# Patient Record
Sex: FEMALE | Race: WHITE | NOT HISPANIC OR LATINO | Employment: OTHER | URBAN - METROPOLITAN AREA
[De-identification: names, ages, dates, MRNs, and addresses within clinical notes are randomized per-mention and may not be internally consistent; named-entity substitution may affect disease eponyms.]

---

## 2022-02-06 ENCOUNTER — APPOINTMENT (EMERGENCY)
Dept: RADIOLOGY | Facility: HOSPITAL | Age: 72
End: 2022-02-06
Payer: COMMERCIAL

## 2022-02-06 ENCOUNTER — HOSPITAL ENCOUNTER (EMERGENCY)
Facility: HOSPITAL | Age: 72
Discharge: HOME | End: 2022-02-06
Attending: EMERGENCY MEDICINE | Admitting: EMERGENCY MEDICINE
Payer: COMMERCIAL

## 2022-02-06 VITALS
RESPIRATION RATE: 20 BRPM | WEIGHT: 110 LBS | SYSTOLIC BLOOD PRESSURE: 96 MMHG | OXYGEN SATURATION: 98 % | HEART RATE: 85 BPM | BODY MASS INDEX: 21.6 KG/M2 | DIASTOLIC BLOOD PRESSURE: 60 MMHG | TEMPERATURE: 98.3 F | HEIGHT: 60 IN

## 2022-02-06 DIAGNOSIS — M54.6 THORACIC BACK PAIN: Primary | ICD-10-CM

## 2022-02-06 LAB
ALBUMIN SERPL BCP-MCNC: 3.9 G/DL (ref 3.5–5)
ALP SERPL-CCNC: 59 U/L (ref 46–116)
ALT SERPL W P-5'-P-CCNC: 26 U/L (ref 12–78)
ANION GAP SERPL CALCULATED.3IONS-SCNC: 6 MMOL/L (ref 4–13)
AST SERPL W P-5'-P-CCNC: 49 U/L (ref 5–45)
BASOPHILS # BLD AUTO: 0.05 THOUSANDS/ΜL (ref 0–0.1)
BASOPHILS NFR BLD AUTO: 1 % (ref 0–1)
BILIRUB SERPL-MCNC: 0.13 MG/DL (ref 0.2–1)
BUN SERPL-MCNC: 19 MG/DL (ref 5–25)
CALCIUM SERPL-MCNC: 9.2 MG/DL (ref 8.3–10.1)
CARDIAC TROPONIN I PNL SERPL HS: 4 NG/L
CHLORIDE SERPL-SCNC: 105 MMOL/L (ref 100–108)
CO2 SERPL-SCNC: 26 MMOL/L (ref 21–32)
CREAT SERPL-MCNC: 0.85 MG/DL (ref 0.6–1.3)
EOSINOPHIL # BLD AUTO: 0.12 THOUSAND/ΜL (ref 0–0.61)
EOSINOPHIL NFR BLD AUTO: 1 % (ref 0–6)
ERYTHROCYTE [DISTWIDTH] IN BLOOD BY AUTOMATED COUNT: 13 % (ref 11.6–15.1)
GFR SERPL CREATININE-BSD FRML MDRD: 68 ML/MIN/1.73SQ M
GLUCOSE SERPL-MCNC: 93 MG/DL (ref 65–140)
HCT VFR BLD AUTO: 36.4 % (ref 34.8–46.1)
HGB BLD-MCNC: 12.3 G/DL (ref 11.5–15.4)
IMM GRANULOCYTES # BLD AUTO: 0.03 THOUSAND/UL (ref 0–0.2)
IMM GRANULOCYTES NFR BLD AUTO: 0 % (ref 0–2)
LYMPHOCYTES # BLD AUTO: 2.11 THOUSANDS/ΜL (ref 0.6–4.47)
LYMPHOCYTES NFR BLD AUTO: 25 % (ref 14–44)
MCH RBC QN AUTO: 33.2 PG (ref 26.8–34.3)
MCHC RBC AUTO-ENTMCNC: 33.8 G/DL (ref 31.4–37.4)
MCV RBC AUTO: 98 FL (ref 82–98)
MONOCYTES # BLD AUTO: 0.62 THOUSAND/ΜL (ref 0.17–1.22)
MONOCYTES NFR BLD AUTO: 8 % (ref 4–12)
NEUTROPHILS # BLD AUTO: 5.39 THOUSANDS/ΜL (ref 1.85–7.62)
NEUTS SEG NFR BLD AUTO: 65 % (ref 43–75)
NRBC BLD AUTO-RTO: 0 /100 WBCS
PLATELET # BLD AUTO: 239 THOUSANDS/UL (ref 149–390)
PMV BLD AUTO: 10.3 FL (ref 8.9–12.7)
POTASSIUM SERPL-SCNC: 3.9 MMOL/L (ref 3.5–5.3)
PROT SERPL-MCNC: 7.1 G/DL (ref 6.4–8.2)
RBC # BLD AUTO: 3.71 MILLION/UL (ref 3.81–5.12)
SODIUM SERPL-SCNC: 137 MMOL/L (ref 136–145)
WBC # BLD AUTO: 8.32 THOUSAND/UL (ref 4.31–10.16)

## 2022-02-06 PROCEDURE — 85025 COMPLETE CBC W/AUTO DIFF WBC: CPT | Performed by: EMERGENCY MEDICINE

## 2022-02-06 PROCEDURE — 99284 EMERGENCY DEPT VISIT MOD MDM: CPT

## 2022-02-06 PROCEDURE — 84484 ASSAY OF TROPONIN QUANT: CPT | Performed by: EMERGENCY MEDICINE

## 2022-02-06 PROCEDURE — 99285 EMERGENCY DEPT VISIT HI MDM: CPT | Performed by: EMERGENCY MEDICINE

## 2022-02-06 PROCEDURE — 93005 ELECTROCARDIOGRAM TRACING: CPT

## 2022-02-06 PROCEDURE — 71260 CT THORAX DX C+: CPT

## 2022-02-06 PROCEDURE — 74177 CT ABD & PELVIS W/CONTRAST: CPT

## 2022-02-06 PROCEDURE — 36415 COLL VENOUS BLD VENIPUNCTURE: CPT | Performed by: EMERGENCY MEDICINE

## 2022-02-06 PROCEDURE — G1004 CDSM NDSC: HCPCS

## 2022-02-06 PROCEDURE — 80053 COMPREHEN METABOLIC PANEL: CPT | Performed by: EMERGENCY MEDICINE

## 2022-02-06 RX ORDER — SENNOSIDES 8.6 MG
650 CAPSULE ORAL EVERY 8 HOURS PRN
Qty: 30 TABLET | Refills: 0 | Status: SHIPPED | OUTPATIENT
Start: 2022-02-06

## 2022-02-06 RX ORDER — METHOCARBAMOL 500 MG/1
500 TABLET, FILM COATED ORAL 2 TIMES DAILY
Qty: 20 TABLET | Refills: 0 | Status: SHIPPED | OUTPATIENT
Start: 2022-02-06

## 2022-02-06 RX ORDER — LIDOCAINE 50 MG/G
1 PATCH TOPICAL ONCE
Status: DISCONTINUED | OUTPATIENT
Start: 2022-02-06 | End: 2022-02-06 | Stop reason: HOSPADM

## 2022-02-06 RX ORDER — ACETAMINOPHEN 325 MG/1
975 TABLET ORAL ONCE
Status: COMPLETED | OUTPATIENT
Start: 2022-02-06 | End: 2022-02-06

## 2022-02-06 RX ADMIN — IOHEXOL 75 ML: 350 INJECTION, SOLUTION INTRAVENOUS at 18:16

## 2022-02-06 RX ADMIN — LIDOCAINE 5% 1 PATCH: 700 PATCH TOPICAL at 17:07

## 2022-02-06 RX ADMIN — ACETAMINOPHEN 975 MG: 325 TABLET, FILM COATED ORAL at 17:06

## 2022-02-07 LAB
ATRIAL RATE: 94 BPM
P AXIS: 30 DEGREES
PR INTERVAL: 144 MS
QRS AXIS: -48 DEGREES
QRSD INTERVAL: 58 MS
QT INTERVAL: 326 MS
QTC INTERVAL: 407 MS
T WAVE AXIS: 29 DEGREES
VENTRICULAR RATE: 94 BPM

## 2022-02-07 PROCEDURE — 93010 ELECTROCARDIOGRAM REPORT: CPT | Performed by: INTERNAL MEDICINE

## 2022-02-07 NOTE — ED ATTENDING ATTESTATION
2/6/2022  IAmy MD, saw and evaluated the patient  I have discussed the patient with the resident/non-physician practitioner and agree with the resident's/non-physician practitioner's findings, Plan of Care, and MDM as documented in the resident's/non-physician practitioner's note, except where noted  All available labs and Radiology studies were reviewed  I was present for key portions of any procedure(s) performed by the resident/non-physician practitioner and I was immediately available to provide assistance  At this point I agree with the current assessment done in the Emergency Department  I have conducted an independent evaluation of this patient a history and physical is as follows:  65-year-old female with severe back pain, patient states that her pain has been out of control and midline  Patient states it has been there for the last several days  Patient fell a week ago, but did not have pain after that  States it is thoracic pain, radiates around her chest, is social with chest tightness  No shortness of breath, pain is worse with breathing  No fevers, chills, nausea, vomiting, diarrhea, numbness, tingling, weakness  Patient has a nonfocal exam   Impression:  Pain, weight loss  Will plan to do CT to rule out pathological fractures    ED Course         Critical Care Time  Procedures

## 2022-02-09 NOTE — ED PROVIDER NOTES
History  Chief Complaint   Patient presents with    Back Pain     PT "I couldnt take it anymore, the pain was eliane bad that I told my son that I needed to come in  I fell last week, I did not hit my head, but it has been bothering me since  And I feel like it is radiating to the front" Pt denies CP      79-year-old female, current smoker presenting from home for evaluation of diffuse back pain, mostly in the thoracic area  Patient had a fall last week, fell forwards onto both of her hands  Did not hit her head at all  No loss of consciousness  Since then she has noticed that she has diffuse back pain  No radiation down her legs at all  She has not had any fevers or chills  No changes to her bladder or bowel habits  No significant headaches  No numbness, tingling, weakness  No recent steroid use  Has been taking anything for her symptoms  No abdominal pain  Has been able to walk but is painful to do so  History provided by:  Patient   used: No    Back Pain  Associated symptoms: no abdominal pain, no chest pain, no dysuria, no fever and no weakness        None       Past Medical History:   Diagnosis Date    Bipolar affect, depressed ([facility] [address] )     Disease of thyroid gland        Past Surgical History:   Procedure Laterality Date    BACK SURGERY      THYROIDECTOMY         No family history on file  I have reviewed and agree with the history as documented  E-Cigarette/Vaping     E-Cigarette/Vaping Substances     Social History     Tobacco Use    Smoking status: Current Every Day Smoker     Packs/day: 0 50     Types: Cigarettes    Smokeless tobacco: Not on file   Substance Use Topics    Alcohol use: Not Currently    Drug use: Not Currently        Review of Systems   Constitutional: Negative for chills and fever  HENT: Negative for congestion and sore throat  Eyes: Negative for pain and visual disturbance  Respiratory: Negative for cough and shortness of breath  Cardiovascular: Negative for chest pain and palpitations  Gastrointestinal: Negative for abdominal pain, diarrhea, nausea and vomiting  Genitourinary: Negative for dysuria and hematuria  Musculoskeletal: Positive for back pain  Negative for arthralgias  Skin: Negative for color change and rash  Neurological: Negative for syncope, weakness and light-headedness  Psychiatric/Behavioral: Negative for confusion  The patient is not nervous/anxious  All other systems reviewed and are negative  Physical Exam  ED Triage Vitals [02/06/22 1644]   Temperature Pulse Respirations Blood Pressure SpO2   98 3 °F (36 8 °C) 97 22 138/76 98 %      Temp Source Heart Rate Source Patient Position - Orthostatic VS BP Location FiO2 (%)   Oral Monitor Sitting Right arm --      Pain Score       8             Orthostatic Vital Signs  Vitals:    02/06/22 1644 02/06/22 1826   BP: 138/76 96/60   Pulse: 97 85   Patient Position - Orthostatic VS: Sitting Sitting       Physical Exam  Vitals and nursing note reviewed  Constitutional:       General: She is not in acute distress  Appearance: She is well-developed  She is not ill-appearing  HENT:      Head: Normocephalic and atraumatic  Right Ear: External ear normal       Left Ear: External ear normal       Nose: Nose normal       Mouth/Throat:      Mouth: Mucous membranes are moist       Pharynx: Oropharynx is clear  Eyes:      Conjunctiva/sclera: Conjunctivae normal    Cardiovascular:      Rate and Rhythm: Normal rate and regular rhythm  Heart sounds: No murmur heard  Pulmonary:      Effort: Pulmonary effort is normal  No respiratory distress  Breath sounds: Normal breath sounds  No wheezing or rales  Abdominal:      Palpations: Abdomen is soft  Tenderness: There is no abdominal tenderness  Musculoskeletal:         General: Normal range of motion  Cervical back: Normal range of motion and neck supple  Right lower leg: No edema  Left lower leg: No edema  Skin:     General: Skin is warm and dry  Neurological:      General: No focal deficit present  Mental Status: She is alert     Psychiatric:         Mood and Affect: Mood normal          ED Medications  Medications   acetaminophen (TYLENOL) tablet 975 mg (975 mg Oral Given 2/6/22 1706)   iohexol (OMNIPAQUE) 350 MG/ML injection (SINGLE-DOSE) 75 mL (75 mL Intravenous Given 2/6/22 1816)       Diagnostic Studies  Results Reviewed     Procedure Component Value Units Date/Time    HS Troponin 0hr (reflex protocol) [[id number]]  (Normal) Collected: 02/06/22 1705    Lab Status: Final result Specimen: Blood from Arm, Right Updated: 02/06/22 1759     hs TnI 0hr 4 ng/L     Comprehensive metabolic panel [[id number]]  (Abnormal) Collected: 02/06/22 1705    Lab Status: Final result Specimen: Blood from Arm, Right Updated: 02/06/22 1745     Sodium 137 mmol/L      Potassium 3 9 mmol/L      Chloride 105 mmol/L      CO2 26 mmol/L      ANION GAP 6 mmol/L      BUN 19 mg/dL      Creatinine 0 85 mg/dL      Glucose 93 mg/dL      Calcium 9 2 mg/dL      AST 49 U/L      ALT 26 U/L      Alkaline Phosphatase 59 U/L      Total Protein 7 1 g/dL      Albumin 3 9 g/dL      Total Bilirubin 0 13 mg/dL      eGFR 68 ml/min/1 73sq m     Narrative:      Estephanie guidelines for Chronic Kidney Disease (CKD):     Stage 1 with normal or high GFR (GFR > 90 mL/min/1 73 square meters)    Stage 2 Mild CKD (GFR = 60-89 mL/min/1 73 square meters)    Stage 3A Moderate CKD (GFR = 45-59 mL/min/1 73 square meters)    Stage 3B Moderate CKD (GFR = 30-44 mL/min/1 73 square meters)    Stage 4 Severe CKD (GFR = 15-29 mL/min/1 73 square meters)    Stage 5 End Stage CKD (GFR <15 mL/min/1 73 square meters)  Note: GFR calculation is accurate only with a steady state creatinine    CBC and differential [[id number]]  (Abnormal) Collected: 02/06/22 1705    Lab Status: Final result Specimen: Blood from Arm, Right Updated: 02/06/22 1721     WBC 8 32 Thousand/uL      RBC 3 71 Million/uL      Hemoglobin 12 3 g/dL      Hematocrit 36 4 %      MCV 98 fL      MCH 33 2 pg      MCHC 33 8 g/dL      RDW 13 0 %      MPV 10 3 fL      Platelets 984 Thousands/uL      nRBC 0 /100 WBCs      Neutrophils Relative 65 %      Immat GRANS % 0 %      Lymphocytes Relative 25 %      Monocytes Relative 8 %      Eosinophils Relative 1 %      Basophils Relative 1 %      Neutrophils Absolute 5 39 Thousands/µL      Immature Grans Absolute 0 03 Thousand/uL      Lymphocytes Absolute 2 11 Thousands/µL      Monocytes Absolute 0 62 Thousand/µL      Eosinophils Absolute 0 12 Thousand/µL      Basophils Absolute 0 05 Thousands/µL                  CT chest abdomen pelvis w contrast   Final Result by Junaid Land MD (02/06 1920)   1  No acute findings  Incidental findings as above  Workstation performed: [id number]               Procedures  ECG 12 Lead Documentation Only    Date/Time: 2/6/2022 8:00 PM  Performed by: Luis Gonzalez MD  Authorized by: Luis Gonzalez MD     Indications / Diagnosis:  Back pain   Patient location:  ED  Previous ECG:     Previous ECG:  Compared to current  Interpretation:     Interpretation: normal    Rate:     ECG rate assessment: normal    Rhythm:     Rhythm: sinus rhythm    Ectopy:     Ectopy: none    QRS:     QRS axis:  Normal  Conduction:     Conduction: normal    ST segments:     ST segments:  Normal  T waves:     T waves: normal    Other findings:     Other findings: poor R wave progression    Comments:      NSR, low voltage throughout, poor R wave progression vs low voltage          ED Course  ED Course as of 02/08/22 2323   Cheyanne Hongz Feb 06, 2022   1801 hs TnI 0hr: 4               Identification of Seniors at Risk      Most Recent Value   (ISAR) Identification of Seniors at Risk    Before the illness or injury that brought you to the Emergency, did you need someone to help you on a regular basis?  0 Filed at: 02/06/2022 1648   In the last 24 hours, have you needed more help than usual? 0 Filed at: 02/06/2022 1648   Have you been hospitalized for one or more nights during the past 6 months? 0 Filed at: 02/06/2022 1648   In general, do you see well? 0 Filed at: 02/06/2022 1648   In general, do you have serious problems with your memory? 1 Filed at: 02/06/2022 1648   Do you take more than three different medications every day? 1 Filed at: 02/06/2022 1648   ISAR Score 2 Filed at: 02/06/2022 1648                              MDM  Number of Diagnoses or Management Options  Thoracic back pain  Diagnosis management comments: 67 yo F presenting for evaluation of back pain after a fall last week  Able to ambulate normally  No red flags of back pain  ECG without acute ischemic changes, trop of 4  CT chest/abd/pelvis without any acute injuries  Patient improved after acetaminophen  Safe for discharge with f/u with her PCP  Discharged  Disposition  Final diagnoses:   Thoracic back pain     Time reflects when diagnosis was documented in both MDM as applicable and the Disposition within this note     Time User Action Codes Description Comment    2/6/2022  8:01 PM Rivard, Thomos Burkitt Add [M54 6] Thoracic back pain       ED Disposition     ED Disposition Condition Date/Time Comment    Discharge Good Sun Feb 6, 2022  8:01 PM Lara South discharge to home/self care  Follow-up Information     Follow up With Specialties Details Why Contact Info Additional [address] Emergency Department Emergency Medicine Go to  As needed [address]  [address] [place] [address] Emergency Department, [address], [address] [phone]          There are no discharge medications for this patient  No discharge procedures on file  PDMP Review     None           ED Provider  Attending physically available and evaluated Lara South   I managed the patient along with the ED Attending      Electronically Signed by         Alcira Tang MD  02/08/22 5013

## 2022-04-12 ENCOUNTER — HOSPITAL ENCOUNTER (EMERGENCY)
Facility: HOSPITAL | Age: 72
Discharge: HOME | End: 2022-04-12
Payer: COMMERCIAL

## 2022-04-12 VITALS
SYSTOLIC BLOOD PRESSURE: 163 MMHG | DIASTOLIC BLOOD PRESSURE: 74 MMHG | OXYGEN SATURATION: 98 % | BODY MASS INDEX: 21.6 KG/M2 | WEIGHT: 110.01 LBS | HEART RATE: 88 BPM | HEIGHT: 60 IN | RESPIRATION RATE: 16 BRPM

## 2022-04-12 LAB
ALBUMIN SERPL BCP-MCNC: 3.7 G/DL (ref 3.5–5)
ALP SERPL-CCNC: 56 U/L (ref 46–116)
ALT SERPL W P-5'-P-CCNC: 23 U/L (ref 12–78)
ANION GAP SERPL CALCULATED.3IONS-SCNC: 10 MMOL/L (ref 4–13)
AST SERPL W P-5'-P-CCNC: 18 U/L (ref 5–45)
BASOPHILS # BLD AUTO: 0.05 THOUSANDS/ΜL (ref 0–0.1)
BASOPHILS NFR BLD AUTO: 1 % (ref 0–1)
BILIRUB SERPL-MCNC: 0.34 MG/DL (ref 0.2–1)
BUN SERPL-MCNC: 14 MG/DL (ref 5–25)
CALCIUM SERPL-MCNC: 8.7 MG/DL (ref 8.3–10.1)
CARDIAC TROPONIN I PNL SERPL HS: 3 NG/L
CHLORIDE SERPL-SCNC: 103 MMOL/L (ref 100–108)
CO2 SERPL-SCNC: 28 MMOL/L (ref 21–32)
CREAT SERPL-MCNC: 0.81 MG/DL (ref 0.6–1.3)
EOSINOPHIL # BLD AUTO: 0.09 THOUSAND/ΜL (ref 0–0.61)
EOSINOPHIL NFR BLD AUTO: 1 % (ref 0–6)
ERYTHROCYTE [DISTWIDTH] IN BLOOD BY AUTOMATED COUNT: 13.2 % (ref 11.6–15.1)
GFR SERPL CREATININE-BSD FRML MDRD: 72 ML/MIN/1.73SQ M
GLUCOSE SERPL-MCNC: 95 MG/DL (ref 65–140)
HCT VFR BLD AUTO: 37.1 % (ref 34.8–46.1)
HGB BLD-MCNC: 12.7 G/DL (ref 11.5–15.4)
IMM GRANULOCYTES # BLD AUTO: 0.03 THOUSAND/UL (ref 0–0.2)
IMM GRANULOCYTES NFR BLD AUTO: 1 % (ref 0–2)
LYMPHOCYTES # BLD AUTO: 2.54 THOUSANDS/ΜL (ref 0.6–4.47)
LYMPHOCYTES NFR BLD AUTO: 39 % (ref 14–44)
MCH RBC QN AUTO: 34 PG (ref 26.8–34.3)
MCHC RBC AUTO-ENTMCNC: 34.2 G/DL (ref 31.4–37.4)
MCV RBC AUTO: 99 FL (ref 82–98)
MONOCYTES # BLD AUTO: 0.5 THOUSAND/ΜL (ref 0.17–1.22)
MONOCYTES NFR BLD AUTO: 8 % (ref 4–12)
NEUTROPHILS # BLD AUTO: 3.29 THOUSANDS/ΜL (ref 1.85–7.62)
NEUTS SEG NFR BLD AUTO: 50 % (ref 43–75)
NRBC BLD AUTO-RTO: 0 /100 WBCS
PLATELET # BLD AUTO: 288 THOUSANDS/UL (ref 149–390)
PMV BLD AUTO: 9.1 FL (ref 8.9–12.7)
POTASSIUM SERPL-SCNC: 3.4 MMOL/L (ref 3.5–5.3)
PROT SERPL-MCNC: 7 G/DL (ref 6.4–8.2)
RBC # BLD AUTO: 3.74 MILLION/UL (ref 3.81–5.12)
SODIUM SERPL-SCNC: 141 MMOL/L (ref 136–145)
WBC # BLD AUTO: 6.5 THOUSAND/UL (ref 4.31–10.16)

## 2022-04-12 PROCEDURE — 36415 COLL VENOUS BLD VENIPUNCTURE: CPT

## 2022-04-12 PROCEDURE — 85025 COMPLETE CBC W/AUTO DIFF WBC: CPT

## 2022-04-12 PROCEDURE — 84484 ASSAY OF TROPONIN QUANT: CPT

## 2022-04-12 PROCEDURE — 80053 COMPREHEN METABOLIC PANEL: CPT

## 2022-04-12 PROCEDURE — 93005 ELECTROCARDIOGRAM TRACING: CPT

## 2022-04-14 LAB
ATRIAL RATE: 84 BPM
P AXIS: 52 DEGREES
PR INTERVAL: 158 MS
QRS AXIS: -54 DEGREES
QRSD INTERVAL: 74 MS
QT INTERVAL: 366 MS
QTC INTERVAL: 432 MS
T WAVE AXIS: 44 DEGREES
VENTRICULAR RATE: 84 BPM

## 2022-04-14 PROCEDURE — 93010 ELECTROCARDIOGRAM REPORT: CPT | Performed by: INTERNAL MEDICINE

## 2022-04-22 ENCOUNTER — HOSPITAL ENCOUNTER (EMERGENCY)
Facility: HOSPITAL | Age: 72
Discharge: HOME | End: 2022-04-23
Attending: EMERGENCY MEDICINE
Payer: COMMERCIAL

## 2022-04-22 VITALS
SYSTOLIC BLOOD PRESSURE: 116 MMHG | TEMPERATURE: 97.7 F | HEART RATE: 82 BPM | DIASTOLIC BLOOD PRESSURE: 56 MMHG | RESPIRATION RATE: 18 BRPM | OXYGEN SATURATION: 99 %

## 2022-04-22 DIAGNOSIS — R55 NEAR SYNCOPE: Primary | ICD-10-CM

## 2022-04-22 PROCEDURE — 99285 EMERGENCY DEPT VISIT HI MDM: CPT

## 2022-04-22 PROCEDURE — 93005 ELECTROCARDIOGRAM TRACING: CPT

## 2022-04-23 ENCOUNTER — APPOINTMENT (EMERGENCY)
Dept: CT IMAGING | Facility: HOSPITAL | Age: 72
End: 2022-04-23
Payer: COMMERCIAL

## 2022-04-23 ENCOUNTER — APPOINTMENT (EMERGENCY)
Dept: RADIOLOGY | Facility: HOSPITAL | Age: 72
End: 2022-04-23
Payer: COMMERCIAL

## 2022-04-23 LAB
ALBUMIN SERPL BCP-MCNC: 3.6 G/DL (ref 3.5–5)
ALP SERPL-CCNC: 61 U/L (ref 46–116)
ALT SERPL W P-5'-P-CCNC: 16 U/L (ref 12–78)
ANION GAP SERPL CALCULATED.3IONS-SCNC: 12 MMOL/L (ref 4–13)
AST SERPL W P-5'-P-CCNC: 18 U/L (ref 5–45)
ATRIAL RATE: 80 BPM
BASOPHILS # BLD AUTO: 0.06 THOUSANDS/ΜL (ref 0–0.1)
BASOPHILS NFR BLD AUTO: 1 % (ref 0–1)
BILIRUB SERPL-MCNC: 0.41 MG/DL (ref 0.2–1)
BUN SERPL-MCNC: 26 MG/DL (ref 5–25)
CALCIUM SERPL-MCNC: 9.1 MG/DL (ref 8.3–10.1)
CARDIAC TROPONIN I PNL SERPL HS: 3 NG/L
CHLORIDE SERPL-SCNC: 102 MMOL/L (ref 100–108)
CO2 SERPL-SCNC: 27 MMOL/L (ref 21–32)
CREAT SERPL-MCNC: 0.76 MG/DL (ref 0.6–1.3)
EOSINOPHIL # BLD AUTO: 0.17 THOUSAND/ΜL (ref 0–0.61)
EOSINOPHIL NFR BLD AUTO: 2 % (ref 0–6)
ERYTHROCYTE [DISTWIDTH] IN BLOOD BY AUTOMATED COUNT: 12.9 % (ref 11.6–15.1)
GFR SERPL CREATININE-BSD FRML MDRD: 78 ML/MIN/1.73SQ M
GLUCOSE SERPL-MCNC: 104 MG/DL (ref 65–140)
HCT VFR BLD AUTO: 36.3 % (ref 34.8–46.1)
HGB BLD-MCNC: 12.2 G/DL (ref 11.5–15.4)
IMM GRANULOCYTES # BLD AUTO: 0.07 THOUSAND/UL (ref 0–0.2)
IMM GRANULOCYTES NFR BLD AUTO: 1 % (ref 0–2)
LYMPHOCYTES # BLD AUTO: 1.7 THOUSANDS/ΜL (ref 0.6–4.47)
LYMPHOCYTES NFR BLD AUTO: 16 % (ref 14–44)
MCH RBC QN AUTO: 33.5 PG (ref 26.8–34.3)
MCHC RBC AUTO-ENTMCNC: 33.6 G/DL (ref 31.4–37.4)
MCV RBC AUTO: 100 FL (ref 82–98)
MONOCYTES # BLD AUTO: 0.85 THOUSAND/ΜL (ref 0.17–1.22)
MONOCYTES NFR BLD AUTO: 8 % (ref 4–12)
NEUTROPHILS # BLD AUTO: 7.8 THOUSANDS/ΜL (ref 1.85–7.62)
NEUTS SEG NFR BLD AUTO: 72 % (ref 43–75)
NRBC BLD AUTO-RTO: 0 /100 WBCS
P AXIS: 53 DEGREES
PLATELET # BLD AUTO: 301 THOUSANDS/UL (ref 149–390)
PMV BLD AUTO: 9.1 FL (ref 8.9–12.7)
POTASSIUM SERPL-SCNC: 3.4 MMOL/L (ref 3.5–5.3)
PR INTERVAL: 174 MS
PROT SERPL-MCNC: 6.8 G/DL (ref 6.4–8.2)
QRS AXIS: -66 DEGREES
QRSD INTERVAL: 62 MS
QT INTERVAL: 358 MS
QTC INTERVAL: 412 MS
RBC # BLD AUTO: 3.64 MILLION/UL (ref 3.81–5.12)
SODIUM SERPL-SCNC: 141 MMOL/L (ref 136–145)
T WAVE AXIS: 53 DEGREES
VENTRICULAR RATE: 80 BPM
WBC # BLD AUTO: 10.65 THOUSAND/UL (ref 4.31–10.16)

## 2022-04-23 PROCEDURE — 70450 CT HEAD/BRAIN W/O DYE: CPT

## 2022-04-23 PROCEDURE — 84484 ASSAY OF TROPONIN QUANT: CPT | Performed by: EMERGENCY MEDICINE

## 2022-04-23 PROCEDURE — 93010 ELECTROCARDIOGRAM REPORT: CPT | Performed by: INTERNAL MEDICINE

## 2022-04-23 PROCEDURE — 80053 COMPREHEN METABOLIC PANEL: CPT | Performed by: EMERGENCY MEDICINE

## 2022-04-23 PROCEDURE — 85025 COMPLETE CBC W/AUTO DIFF WBC: CPT | Performed by: EMERGENCY MEDICINE

## 2022-04-23 PROCEDURE — 36415 COLL VENOUS BLD VENIPUNCTURE: CPT | Performed by: EMERGENCY MEDICINE

## 2022-04-23 PROCEDURE — G1004 CDSM NDSC: HCPCS

## 2022-04-23 PROCEDURE — 99285 EMERGENCY DEPT VISIT HI MDM: CPT | Performed by: EMERGENCY MEDICINE

## 2022-04-23 PROCEDURE — 71045 X-RAY EXAM CHEST 1 VIEW: CPT

## 2022-04-23 NOTE — ED PROVIDER NOTES
History  Chief Complaint   Patient presents with    Syncope     pt arrives ems after falling at home after a syncopal episode  +headstrike, -LOC, -thinners  Hx of syncope, slightly confused at baseline  Patient is a 69-year-old female with a history of chronic back pain, and anxiety disorder who presents with change in mental status and near syncope  Patient is here with her son he states that they were in his room at home  He asked for a glass of water and patient left his room  She came back a couple minutes later without water  She fell to the ground according to son  She did not hit her head and was able to get herself up  However she stumbled once again and fell, striking her head against the wall  She did not lose consciousness  However he states that she was less responsive and had a blank stare  She seemed to return to baseline within 30 seconds to 1 minute  He states that she is back to baseline other than appearing somewhat pale  Patient has no physical complaints, but is concerned about what happened this evening  She is not on any anticoagulants  History provided by:  Patient and relative  Syncope  Episode history: near syncope  Most recent episode: Today  Progression:  Resolved  Chronicity:  New  Witnessed: yes    Associated symptoms: dizziness    Associated symptoms: no chest pain, no diaphoresis, no difficulty breathing, no fever, no focal weakness, no headaches, no nausea, no palpitations, no seizures, no shortness of breath and no vomiting        Prior to Admission Medications   Prescriptions Last Dose Informant Patient Reported?  Taking?   acetaminophen (TYLENOL) 650 mg CR tablet   No No   Sig: Take 1 tablet (650 mg total) by mouth every 8 (eight) hours as needed for mild pain   methocarbamol (ROBAXIN) 500 mg tablet   No No   Sig: Take 1 tablet (500 mg total) by mouth 2 (two) times a day      Facility-Administered Medications: None       Past Medical History:   Diagnosis Date    Bipolar affect, depressed ([facility] [address] )     Disease of thyroid gland        Past Surgical History:   Procedure Laterality Date    BACK SURGERY      THYROIDECTOMY         History reviewed  No pertinent family history  I have reviewed and agree with the history as documented  E-Cigarette/Vaping     E-Cigarette/Vaping Substances     Social History     Tobacco Use    Smoking status: Current Every Day Smoker     Packs/day: 0 50     Types: Cigarettes    Smokeless tobacco: Not on file   Substance Use Topics    Alcohol use: Not Currently    Drug use: Not Currently       Review of Systems   Constitutional: Negative for chills, diaphoresis and fever  HENT: Negative for nosebleeds, sore throat and trouble swallowing  Eyes: Negative for photophobia, pain and visual disturbance  Respiratory: Negative for cough, chest tightness and shortness of breath  Cardiovascular: Positive for syncope  Negative for chest pain, palpitations and leg swelling  Gastrointestinal: Negative for abdominal pain, constipation, diarrhea, nausea and vomiting  Endocrine: Negative for polydipsia and polyuria  Genitourinary: Negative for difficulty urinating, dysuria, hematuria, pelvic pain, vaginal bleeding and vaginal discharge  Musculoskeletal: Negative for back pain, neck pain and neck stiffness  Skin: Negative for pallor and rash  Neurological: Positive for dizziness  Negative for focal weakness, seizures, light-headedness and headaches  All other systems reviewed and are negative  Physical Exam  Physical Exam  Vitals and nursing note reviewed  Constitutional:       General: She is not in acute distress  Comments: Thin-appearing female   HENT:      Head: Normocephalic and atraumatic  Eyes:      Pupils: Pupils are equal, round, and reactive to light  Cardiovascular:      Rate and Rhythm: Normal rate and regular rhythm  Pulses: Normal pulses  Heart sounds: Normal heart sounds     Pulmonary: Effort: Pulmonary effort is normal  No respiratory distress  Breath sounds: Normal breath sounds  Abdominal:      General: There is no distension  Palpations: Abdomen is soft  Abdomen is not rigid  Tenderness: There is no abdominal tenderness  There is no guarding or rebound  Musculoskeletal:         General: No tenderness  Normal range of motion  Cervical back: Normal range of motion and neck supple  Lymphadenopathy:      Cervical: No cervical adenopathy  Skin:     General: Skin is warm and dry  Capillary Refill: Capillary refill takes less than 2 seconds  Neurological:      Mental Status: She is alert and oriented to person, place, and time  Cranial Nerves: No cranial nerve deficit  Sensory: No sensory deficit  Motor: Motor function is intact  Coordination: Coordination is intact  Finger-Nose-Finger Test normal       Comments: Speech fluent  Visual fields intact    Cerebellar exam normal          Vital Signs  ED Triage Vitals   Temperature Pulse Respirations Blood Pressure SpO2   04/22/22 2329 04/22/22 2327 04/22/22 2327 04/22/22 2327 04/22/22 2327   97 7 °F (36 5 °C) 83 20 97/65 99 %      Temp Source Heart Rate Source Patient Position - Orthostatic VS BP Location FiO2 (%)   04/22/22 2329 04/22/22 2327 04/22/22 2327 04/22/22 2327 --   Oral Monitor Lying Right arm       Pain Score       04/22/22 2327       No Pain           Vitals:    04/22/22 2327 04/22/22 2349   BP: 97/65 116/56   Pulse: 83 82   Patient Position - Orthostatic VS: Lying Lying         Visual Acuity  Visual Acuity      Most Recent Value   L Pupil Size (mm) 2   R Pupil Size (mm) 2          ED Medications  Medications - No data to display    Diagnostic Studies  Results Reviewed     Procedure Component Value Units Date/Time    Comprehensive metabolic panel [[id number]]  (Abnormal) Collected: 04/23/22 0016    Lab Status: Final result Specimen: Blood from Arm, Right Updated: 04/23/22 0058     Sodium 141 mmol/L      Potassium 3 4 mmol/L      Chloride 102 mmol/L      CO2 27 mmol/L      ANION GAP 12 mmol/L      BUN 26 mg/dL      Creatinine 0 76 mg/dL      Glucose 104 mg/dL      Calcium 9 1 mg/dL      AST 18 U/L      ALT 16 U/L      Alkaline Phosphatase 61 U/L      Total Protein 6 8 g/dL      Albumin 3 6 g/dL      Total Bilirubin 0 41 mg/dL      eGFR 78 ml/min/1 73sq m     Narrative:      National Kidney Disease Foundation guidelines for Chronic Kidney Disease (CKD):     Stage 1 with normal or high GFR (GFR > 90 mL/min/1 73 square meters)    Stage 2 Mild CKD (GFR = 60-89 mL/min/1 73 square meters)    Stage 3A Moderate CKD (GFR = 45-59 mL/min/1 73 square meters)    Stage 3B Moderate CKD (GFR = 30-44 mL/min/1 73 square meters)    Stage 4 Severe CKD (GFR = 15-29 mL/min/1 73 square meters)    Stage 5 End Stage CKD (GFR <15 mL/min/1 73 square meters)  Note: GFR calculation is accurate only with a steady state creatinine    HS Troponin 0hr (reflex protocol) [[id number]]  (Normal) Collected: 04/23/22 0016    Lab Status: Final result Specimen: Blood from Arm, Right Updated: 04/23/22 0053     hs TnI 0hr 3 ng/L     CBC and differential [[id number]]  (Abnormal) Collected: 04/23/22 0016    Lab Status: Final result Specimen: Blood from Arm, Right Updated: 04/23/22 0023     WBC 10 65 Thousand/uL      RBC 3 64 Million/uL      Hemoglobin 12 2 g/dL      Hematocrit 36 3 %       fL      MCH 33 5 pg      MCHC 33 6 g/dL      RDW 12 9 %      MPV 9 1 fL      Platelets 718 Thousands/uL      nRBC 0 /100 WBCs      Neutrophils Relative 72 %      Immat GRANS % 1 %      Lymphocytes Relative 16 %      Monocytes Relative 8 %      Eosinophils Relative 2 %      Basophils Relative 1 %      Neutrophils Absolute 7 80 Thousands/µL      Immature Grans Absolute 0 07 Thousand/uL      Lymphocytes Absolute 1 70 Thousands/µL      Monocytes Absolute 0 85 Thousand/µL      Eosinophils Absolute 0 17 Thousand/µL      Basophils Absolute 0 06 Thousands/µL                  XR chest 1 view portable   ED Interpretation by Luis Tellez DO (04/23 0044)   No cardiomegaly  No infiltrates  Final Result by Adriana Solorio MD (04/23 1012)      No acute cardiopulmonary disease  Workstation performed: [id number]         CT head without contrast   Final Result by Juan Doe MD (04/23 0113)      No acute intracranial abnormality  Moderate chronic small vessel ischemic changes  Workstation performed: [id number]                    Procedures  ECG 12 Lead Documentation Only    Date/Time: 4/23/2022 1:25 AM  Performed by: Luis Tellez DO  Authorized by: Luis Tellez DO     ECG reviewed by me, the ED Provider: yes    Patient location:  ED  Previous ECG:     Previous ECG:  Unavailable    Comparison to cardiac monitor: Yes    Comments:      Normal sinus rhythm at a rate of 80 beats per minute  Normal intervals  Left axis deviation  Normal QRS  No ST T wave abnormalities  No old for comparison  ED Course                                             MDM  Number of Diagnoses or Management Options  Near syncope: new and requires workup  Diagnosis management comments: Patient presents with an episode of near-syncope  Patient fell to the ground but did not lose consciousness  She has no evidence of trauma on exam and CT head is negative for acute traumatic injury  EKG reveals no evidence of acute ischemia, bradycardia, AV blocks, WPW, prolonged QTC, Brugada syndrome or other dysrhythmias  Troponin is unremarkable and patient reports no episodes of chest pain  Therefore do not feel that delta troponin is indicated  Patient is feeling back to baseline and is requesting discharge  Do not feel that hospitalization for cardiac monitoring is indicated  Therefore patient will be discharged for outpatient follow-up  I recommended that she return to ED if symptoms recur    Patient ambulated independently from the ED and is well-appearing  Portions of the above record have been created with voice recognition software   Occasional wrong word or "sound alike" substitutions may have occurred due to the inherent limitations of voice recognition software   Read the chart carefully and recognize, using context, where substitutions may have occurred  Amount and/or Complexity of Data Reviewed  Clinical lab tests: ordered and reviewed  Tests in the radiology section of CPT®: ordered and reviewed  Tests in the medicine section of CPT®: ordered and reviewed  Review and summarize past medical records: yes  Independent visualization of images, tracings, or specimens: yes    Risk of Complications, Morbidity, and/or Mortality  Presenting problems: high  Diagnostic procedures: high  Management options: moderate    Patient Progress  Patient progress: improved      Disposition  Final diagnoses:   Near syncope     Time reflects when diagnosis was documented in both MDM as applicable and the Disposition within this note     Time User Action Codes Description Comment    4/23/2022  1:22 AM Pecolia Northern Add [R55] Near syncope       ED Disposition     ED Disposition Condition Date/Time Comment    Discharge Stable Sat Apr 23, 2022  1:22 AM Rosalie Hernandez discharge to home/self care  Follow-up Information     Follow up With Specialties Details Why Contact Info    Annalee Kingsley PA-C Physician Assistant Schedule an appointment as soon as possible for a visit  Return to ED sooner if symptoms recur or develops chest pain, shortness of breath or other concerns   César Cordero [address] [place] [phone]            Discharge Medication List as of 4/23/2022  1:23 AM      CONTINUE these medications which have NOT CHANGED    Details   acetaminophen (TYLENOL) 650 mg CR tablet Take 1 tablet (650 mg total) by mouth every 8 (eight) hours as needed for mild pain, Starting Sun 2/6/2022, Normal methocarbamol (ROBAXIN) 500 mg tablet Take 1 tablet (500 mg total) by mouth 2 (two) times a day, Starting Sun 2/6/2022, Normal             No discharge procedures on file      PDMP Review     None          ED Provider  Electronically Signed by           Daphne Gore DO  04/23/22 8588

## 2022-05-11 ENCOUNTER — HOSPITAL ENCOUNTER (EMERGENCY)
Facility: HOSPITAL | Age: 72
Discharge: HOME | End: 2022-05-12
Attending: EMERGENCY MEDICINE | Admitting: EMERGENCY MEDICINE
Payer: COMMERCIAL

## 2022-05-11 DIAGNOSIS — F31.9 BIPOLAR 1 DISORDER (HCC): ICD-10-CM

## 2022-05-11 DIAGNOSIS — F03.90 DEMENTIA (HCC): Primary | ICD-10-CM

## 2022-05-11 PROCEDURE — 99284 EMERGENCY DEPT VISIT MOD MDM: CPT

## 2022-05-12 VITALS
DIASTOLIC BLOOD PRESSURE: 50 MMHG | HEART RATE: 86 BPM | SYSTOLIC BLOOD PRESSURE: 90 MMHG | OXYGEN SATURATION: 99 % | TEMPERATURE: 98.9 F | RESPIRATION RATE: 18 BRPM

## 2022-05-12 LAB
ALBUMIN SERPL BCP-MCNC: 3.7 G/DL (ref 3.5–5)
ALP SERPL-CCNC: 52 U/L (ref 46–116)
ALT SERPL W P-5'-P-CCNC: 17 U/L (ref 12–78)
AMPHETAMINES SERPL QL SCN: NEGATIVE
ANION GAP SERPL CALCULATED.3IONS-SCNC: 7 MMOL/L (ref 4–13)
AST SERPL W P-5'-P-CCNC: 21 U/L (ref 5–45)
BACTERIA UR QL AUTO: ABNORMAL /HPF
BARBITURATES UR QL: NEGATIVE
BASOPHILS # BLD AUTO: 0.05 THOUSANDS/ΜL (ref 0–0.1)
BASOPHILS NFR BLD AUTO: 1 % (ref 0–1)
BENZODIAZ UR QL: NEGATIVE
BILIRUB SERPL-MCNC: 0.23 MG/DL (ref 0.2–1)
BILIRUB UR QL STRIP: NEGATIVE
BUN SERPL-MCNC: 22 MG/DL (ref 5–25)
CALCIUM SERPL-MCNC: 9.2 MG/DL (ref 8.3–10.1)
CAOX CRY URNS QL MICRO: ABNORMAL /HPF
CHLORIDE SERPL-SCNC: 107 MMOL/L (ref 100–108)
CLARITY UR: CLEAR
CO2 SERPL-SCNC: 26 MMOL/L (ref 21–32)
COCAINE UR QL: NEGATIVE
COLOR UR: YELLOW
CREAT SERPL-MCNC: 0.94 MG/DL (ref 0.6–1.3)
EOSINOPHIL # BLD AUTO: 0.13 THOUSAND/ΜL (ref 0–0.61)
EOSINOPHIL NFR BLD AUTO: 2 % (ref 0–6)
ERYTHROCYTE [DISTWIDTH] IN BLOOD BY AUTOMATED COUNT: 13.2 % (ref 11.6–15.1)
ETHANOL EXG-MCNC: 0 MG/DL
GFR SERPL CREATININE-BSD FRML MDRD: 60 ML/MIN/1.73SQ M
GLUCOSE SERPL-MCNC: 146 MG/DL (ref 65–140)
GLUCOSE UR STRIP-MCNC: NEGATIVE MG/DL
HCT VFR BLD AUTO: 34.9 % (ref 34.8–46.1)
HGB BLD-MCNC: 11.9 G/DL (ref 11.5–15.4)
HGB UR QL STRIP.AUTO: NEGATIVE
HYALINE CASTS #/AREA URNS LPF: ABNORMAL /LPF
IMM GRANULOCYTES # BLD AUTO: 0.03 THOUSAND/UL (ref 0–0.2)
IMM GRANULOCYTES NFR BLD AUTO: 1 % (ref 0–2)
KETONES UR STRIP-MCNC: ABNORMAL MG/DL
LEUKOCYTE ESTERASE UR QL STRIP: ABNORMAL
LYMPHOCYTES # BLD AUTO: 2.33 THOUSANDS/ΜL (ref 0.6–4.47)
LYMPHOCYTES NFR BLD AUTO: 35 % (ref 14–44)
MCH RBC QN AUTO: 33.7 PG (ref 26.8–34.3)
MCHC RBC AUTO-ENTMCNC: 34.1 G/DL (ref 31.4–37.4)
MCV RBC AUTO: 99 FL (ref 82–98)
METHADONE UR QL: NEGATIVE
MONOCYTES # BLD AUTO: 0.48 THOUSAND/ΜL (ref 0.17–1.22)
MONOCYTES NFR BLD AUTO: 7 % (ref 4–12)
MUCOUS THREADS UR QL AUTO: ABNORMAL
NEUTROPHILS # BLD AUTO: 3.62 THOUSANDS/ΜL (ref 1.85–7.62)
NEUTS SEG NFR BLD AUTO: 54 % (ref 43–75)
NITRITE UR QL STRIP: NEGATIVE
NON-SQ EPI CELLS URNS QL MICRO: ABNORMAL /HPF
NRBC BLD AUTO-RTO: 0 /100 WBCS
OPIATES UR QL SCN: NEGATIVE
OXYCODONE+OXYMORPHONE UR QL SCN: NEGATIVE
PCP UR QL: NEGATIVE
PH UR STRIP.AUTO: 5.5 [PH]
PLATELET # BLD AUTO: 310 THOUSANDS/UL (ref 149–390)
PMV BLD AUTO: 9.4 FL (ref 8.9–12.7)
POTASSIUM SERPL-SCNC: 3.1 MMOL/L (ref 3.5–5.3)
PROT SERPL-MCNC: 6.7 G/DL (ref 6.4–8.2)
PROT UR STRIP-MCNC: ABNORMAL MG/DL
RBC # BLD AUTO: 3.53 MILLION/UL (ref 3.81–5.12)
RBC #/AREA URNS AUTO: ABNORMAL /HPF
RENAL EPI CELLS #/AREA URNS HPF: PRESENT /[HPF]
SODIUM SERPL-SCNC: 140 MMOL/L (ref 136–145)
SP GR UR STRIP.AUTO: 1.03 (ref 1–1.03)
THC UR QL: NEGATIVE
TSH SERPL DL<=0.05 MIU/L-ACNC: 4.19 UIU/ML (ref 0.45–4.5)
UROBILINOGEN UR STRIP-ACNC: <2 MG/DL
WBC # BLD AUTO: 6.64 THOUSAND/UL (ref 4.31–10.16)
WBC #/AREA URNS AUTO: ABNORMAL /HPF

## 2022-05-12 PROCEDURE — 84443 ASSAY THYROID STIM HORMONE: CPT | Performed by: EMERGENCY MEDICINE

## 2022-05-12 PROCEDURE — 87086 URINE CULTURE/COLONY COUNT: CPT | Performed by: EMERGENCY MEDICINE

## 2022-05-12 PROCEDURE — 82075 ASSAY OF BREATH ETHANOL: CPT | Performed by: EMERGENCY MEDICINE

## 2022-05-12 PROCEDURE — 81001 URINALYSIS AUTO W/SCOPE: CPT | Performed by: EMERGENCY MEDICINE

## 2022-05-12 PROCEDURE — 80307 DRUG TEST PRSMV CHEM ANLYZR: CPT | Performed by: EMERGENCY MEDICINE

## 2022-05-12 PROCEDURE — 80053 COMPREHEN METABOLIC PANEL: CPT | Performed by: EMERGENCY MEDICINE

## 2022-05-12 PROCEDURE — 36415 COLL VENOUS BLD VENIPUNCTURE: CPT | Performed by: EMERGENCY MEDICINE

## 2022-05-12 PROCEDURE — 99282 EMERGENCY DEPT VISIT SF MDM: CPT | Performed by: EMERGENCY MEDICINE

## 2022-05-12 PROCEDURE — 85025 COMPLETE CBC W/AUTO DIFF WBC: CPT | Performed by: EMERGENCY MEDICINE

## 2022-05-12 NOTE — ED NOTES
Dr Corrie Matt at bedside to update patient  Pt's family will come at 1300 to  patient        Lopez Mayberry RN  05/12/22 [address], RN  05/12/22 4503

## 2022-05-12 NOTE — CASE MANAGEMENT
Case Management Discharge Planning Note    Patient name Elif Allred  Location ED 12/ED 12 MRN [id number]  : 1950 Date 2022       Current Admission Date: 2022  Current Admission Diagnosis:Psychiatric complaint   There are no problems to display for this patient       LOS (days): 0  Geometric Mean LOS (GMLOS) (days):   Days to GMLOS:     OBJECTIVE:            Current admission status: Emergency   Preferred Pharmacy:   [address] [facility], [address] [place] [address]  Phone: [phone] Fax: [phone]    Primary Care Provider: Vane Ram PA-C    Primary Insurance: [facility]  Secondary Insurance:     DISCHARGE DETAILS:    Discharge planning discussed with[de-identified] Pt's dtr Dann Sandoval ([phone]) and ex  Georgia Landaverde ([phone])  Freedom of Choice: Yes  Comments - Freedom of Choice: plan is to d/c home w/ [facility] (referral sent and aging referral submitted)  CM contacted family/caregiver?: Yes  Were Treatment Team discharge recommendations reviewed with patient/caregiver?: Yes  Did patient/caregiver verbalize understanding of patient care needs?: Yes  Were patient/caregiver advised of the risks associated with not following Treatment Team discharge recommendations?: Yes    Contacts  Patient Contacts: Georgia Landaverde (ex )  Relationship to Patient[de-identified] Other (Comment)  Contact Method: Phone  Phone Number: [phone]  Reason/Outcome: Continuity of Care, Referral, Discharge [address]         Is the patient interested in Teodora Lr at discharge?: Yes  [address] requested[de-identified] [address] Work, [address] Name[de-identified] Other  [facility] Provider[de-identified] PCP  [address] Needed[de-identified] Evaluate Functional Status and Safety, Other (comment) (Medication Management)  Homebound Criteria Met[de-identified] Requires the Assistance of Another Person for Safe Ambulation or to Leave the Home  Supporting Clincal Findings[de-identified] Limited Endurance, Cognitive Deficit Requiring the Assistance of Others    DME Referral Provided  Referral made for DME?: No    Other Referral/Resources/Interventions Provided:  Interventions: [facility]         Treatment Team Recommendation: Home with [address] [place] [address]  Discharge Destination Plan[de-identified] Home with Jackeline at Discharge : Family           ETA of Transport (Date): 05/12/22  ETA of Transport (Time): 1300        Accompanied by: Re Ureña

## 2022-05-12 NOTE — ED NOTES
[facility] area of aging called at this time to discuss pt current situation  Voice mail left        Oliverio Egan RN  05/12/22 5323

## 2022-05-12 NOTE — ED PROVIDER NOTES
History  Chief Complaint   Patient presents with    Medical Problem     Pt neighbor called ems after pt had fight with her son, who she lives with  Per ems pt has difficulty remembering events that have been taking place     44-year-old female presents for unknown reason  Patient states that she does not know why she is here  I asked her if she knows the date and she has to look at the white board to see the date  I asked her what she was doing today and if she could remember how she got to the emergency department  She cannot answer me  She thinks she lives with her sister  According to EMS a neighbor heard her fighting with her son and they called EMS  Otherwise I do not have any history  I see that she has been to the emergency department in the past for some syncopal episode as well as psychiatric issues  There is a questionable history of dementia according to EMS although I did not specifically talk to them  I tried to contact the patient's son, he did not answer his phone  Patient has been evaluated in the past month for dementia versus delirium, there was question whether not this was psychiatric in nature versus organic  She had a fairly extensive workup that was negative for any organic cause of her memory loss and confusion  Medical Problem      Prior to Admission Medications   Prescriptions Last Dose Informant Patient Reported?  Taking?   acetaminophen (TYLENOL) 650 mg CR tablet Not Taking at Unknown time  No No   Sig: Take 1 tablet (650 mg total) by mouth every 8 (eight) hours as needed for mild pain   Patient not taking: Reported on 5/11/2022   methocarbamol (ROBAXIN) 500 mg tablet Not Taking at Unknown time  No No   Sig: Take 1 tablet (500 mg total) by mouth 2 (two) times a day   Patient not taking: Reported on 5/11/2022      Facility-Administered Medications: None       Past Medical History:   Diagnosis Date    Bipolar affect, depressed ([facility] [address] )     Disease of thyroid gland Past Surgical History:   Procedure Laterality Date    BACK SURGERY      THYROIDECTOMY         History reviewed  No pertinent family history  I have reviewed and agree with the history as documented  E-Cigarette/Vaping     E-Cigarette/Vaping Substances     Social History     Tobacco Use    Smoking status: Current Every Day Smoker     Packs/day: 0 50     Types: Cigarettes   Substance Use Topics    Alcohol use: Not Currently    Drug use: Not Currently       Review of Systems   Unable to perform ROS: Psychiatric disorder       Physical Exam  Physical Exam  Vitals and nursing note reviewed  Constitutional:       General: She is not in acute distress  Appearance: She is well-developed  She is not diaphoretic  HENT:      Head: Normocephalic and atraumatic  Nose: Nose normal    Eyes:      General: No scleral icterus  Conjunctiva/sclera: Conjunctivae normal       Pupils: Pupils are equal, round, and reactive to light  Neck:      Thyroid: No thyromegaly  Vascular: No JVD  Trachea: No tracheal deviation  Cardiovascular:      Rate and Rhythm: Normal rate and regular rhythm  Heart sounds: Normal heart sounds  No friction rub  No gallop  Pulmonary:      Effort: Pulmonary effort is normal  No respiratory distress  Breath sounds: Normal breath sounds  No wheezing or rales  Chest:      Chest wall: No tenderness  Abdominal:      General: Bowel sounds are normal  There is no distension  Palpations: Abdomen is soft  There is no mass  Tenderness: There is no abdominal tenderness  There is no guarding or rebound  Hernia: No hernia is present  Musculoskeletal:         General: No tenderness or deformity  Normal range of motion  Cervical back: Normal range of motion and neck supple  Skin:     General: Skin is warm and dry  Findings: No erythema  Neurological:      Mental Status: She is alert  Mental status is at baseline  She is disoriented  Cranial Nerves: No cranial nerve deficit  Coordination: Coordination normal       Deep Tendon Reflexes: Reflexes are normal and symmetric  Comments: Unsure of baseline   Possibly confused vs baseline demtentia         Vital Signs  ED Triage Vitals   Temperature Pulse Respirations Blood Pressure SpO2   05/11/22 2102 05/11/22 2103 05/11/22 2102 05/11/22 2102 05/11/22 2103   98 9 °F (37 2 °C) (!) 108 16 136/80 99 %      Temp Source Heart Rate Source Patient Position - Orthostatic VS BP Location FiO2 (%)   05/11/22 2102 05/11/22 2103 05/11/22 2115 05/11/22 2115 --   Oral Monitor Lying Right arm       Pain Score       --                  Vitals:    05/11/22 2103 05/11/22 2115 05/11/22 2313 05/12/22 0141   BP:  136/80 134/63 90/50   Pulse: (!) 108 94 91 86   Patient Position - Orthostatic VS:  Lying Lying Lying         Visual Acuity      ED Medications  Medications - No data to display    Diagnostic Studies  Results Reviewed     Procedure Component Value Units Date/Time    Urine culture [[id number]] Collected: 05/12/22 0740    Lab Status: Preliminary result Specimen: Urine, Other Updated: 05/13/22 0731     Urine Culture Culture too young- will reincubate    Urine Microscopic [[id number]]  (Abnormal) Collected: 05/12/22 0740    Lab Status: Final result Specimen: Urine, Other Updated: 05/12/22 0906     RBC, UA 1-2 /hpf      WBC, UA 10-20 /hpf      Epithelial Cells Occasional /hpf      Bacteria, UA None Seen /hpf      MUCUS THREADS Occasional     Hyaline Casts, UA 25-50 /lpf      Ca Oxalate Winnie, UA Moderate /hpf      Renal Epithelial Cells PRESENT    UA w Reflex to Microscopic w Reflex to Culture [[id number]]  (Abnormal) Collected: 05/12/22 0740    Lab Status: Final result Specimen: Urine, Other Updated: 05/12/22 0837     Color, UA Yellow     Clarity, UA Clear     Specific Gravity, UA 1 027     pH, UA 5 5     Leukocytes, UA Moderate     Nitrite, UA Negative     Protein, UA Trace mg/dl      Glucose, UA Negative mg/dl Ketones, UA Trace mg/dl      Urobilinogen, UA <2 0 mg/dl      Bilirubin, UA Negative     Blood, UA Negative    Rapid drug screen, urine [[id number]]  (Normal) Collected: 05/12/22 0741    Lab Status: Final result Specimen: Urine, Other Updated: 05/12/22 0817     Amph/Meth UR Negative     Barbiturate Ur Negative     Benzodiazepine Urine Negative     Cocaine Urine Negative     Methadone Urine Negative     Opiate Urine Negative     PCP Ur Negative     THC Urine Negative     Oxycodone Urine Negative    Narrative:      FOR MEDICAL PURPOSES ONLY  IF CONFIRMATION NEEDED PLEASE CONTACT THE LAB WITHIN 5 DAYS  Drug Screen Cutoff Levels:  AMPHETAMINE/METHAMPHETAMINES  1000 ng/mL  BARBITURATES     200 ng/mL  BENZODIAZEPINES     200 ng/mL  COCAINE      300 ng/mL  METHADONE      300 ng/mL  OPIATES      300 ng/mL  PHENCYCLIDINE     25 ng/mL  THC       50 ng/mL  OXYCODONE      100 ng/mL    TSH [[id number]]  (Normal) Collected: 05/12/22 0133    Lab Status: Final result Specimen: Blood from Arm, Right Updated: 05/12/22 0218     TSH 3RD GENERATON 4 190 uIU/mL     Narrative:      Patients undergoing fluorescein dye angiography may retain small amounts of fluorescein in the body for 48-72 hours post procedure  Samples containing fluorescein can produce falsely depressed TSH values  If the patient had this procedure,a specimen should be resubmitted post fluorescein clearance        CBC and differential [[id number]]  (Abnormal) Collected: 05/12/22 0133    Lab Status: Final result Specimen: Blood from Arm, Right Updated: 05/12/22 0214     WBC 6 64 Thousand/uL      RBC 3 53 Million/uL      Hemoglobin 11 9 g/dL      Hematocrit 34 9 %      MCV 99 fL      MCH 33 7 pg      MCHC 34 1 g/dL      RDW 13 2 %      MPV 9 4 fL      Platelets 838 Thousands/uL      nRBC 0 /100 WBCs      Neutrophils Relative 54 %      Immat GRANS % 1 %      Lymphocytes Relative 35 %      Monocytes Relative 7 %      Eosinophils Relative 2 %      Basophils Relative 1 % Neutrophils Absolute 3 62 Thousands/µL      Immature Grans Absolute 0 03 Thousand/uL      Lymphocytes Absolute 2 33 Thousands/µL      Monocytes Absolute 0 48 Thousand/µL      Eosinophils Absolute 0 13 Thousand/µL      Basophils Absolute 0 05 Thousands/µL     Comprehensive metabolic panel [[id number]]  (Abnormal) Collected: 05/12/22 0133    Lab Status: Final result Specimen: Blood from Arm, Right Updated: 05/12/22 4897     Sodium 140 mmol/L      Potassium 3 1 mmol/L      Chloride 107 mmol/L      CO2 26 mmol/L      ANION GAP 7 mmol/L      BUN 22 mg/dL      Creatinine 0 94 mg/dL      Glucose 146 mg/dL      Calcium 9 2 mg/dL      AST 21 U/L      ALT 17 U/L      Alkaline Phosphatase 52 U/L      Total Protein 6 7 g/dL      Albumin 3 7 g/dL      Total Bilirubin 0 23 mg/dL      eGFR 60 ml/min/1 73sq m     Narrative:      Estephanie guidelines for Chronic Kidney Disease (CKD):     Stage 1 with normal or high GFR (GFR > 90 mL/min/1 73 square meters)    Stage 2 Mild CKD (GFR = 60-89 mL/min/1 73 square meters)    Stage 3A Moderate CKD (GFR = 45-59 mL/min/1 73 square meters)    Stage 3B Moderate CKD (GFR = 30-44 mL/min/1 73 square meters)    Stage 4 Severe CKD (GFR = 15-29 mL/min/1 73 square meters)    Stage 5 End Stage CKD (GFR <15 mL/min/1 73 square meters)  Note: GFR calculation is accurate only with a steady state creatinine    POCT alcohol breath test [[id number]]  (Normal) Resulted: 05/12/22 0118    Lab Status: Final result Updated: 05/12/22 0118     EXTBreath Alcohol 0                 No orders to display              Procedures  Procedures         ED Course  ED Course as of 05/13/22 1520   Thu May 12, 2022   0024 Area for aging contacted, recommended keeping the patient in the emergency department for  referral as well as contacting [facility] area for aging               Identification of Seniors at [facility] Recent Value   (ISAR) Identification of Seniors at Risk    Before the illness or injury that brought you to the Emergency, did you need someone to help you on a regular basis? 0 Filed at: 05/11/2022 2104   In the last 24 hours, have you needed more help than usual? 0 Filed at: 05/11/2022 2104   Have you been hospitalized for one or more nights during the past 6 months? 0 Filed at: 05/11/2022 2104   In general, do you see well? 0 Filed at: 05/11/2022 2104   In general, do you have serious problems with your memory? 1 Filed at: 05/11/2022 2104   Do you take more than three different medications every day? 0 Filed at: 05/11/2022 2104   ISAR Score 1 Filed at: 05/11/2022 2104                      SBIRT 22yo+    Flowsheet Row Most Recent Value   SBIRT (23 yo +)    In order to provide better care to our patients, we are screening all of our patients for alcohol and drug use  Would it be okay to ask you these screening questions? Yes Filed at: 05/11/2022 2120   Initial Alcohol Screen: US AUDIT-C     1  How often do you have a drink containing alcohol? 0 Filed at: 05/11/2022 2120   2  How many drinks containing alcohol do you have on a typical day you are drinking? 0 Filed at: 05/11/2022 2120   3b  FEMALE Any Age, or MALE 65+: How often do you have 4 or more drinks on one occassion? 0 Filed at: 05/11/2022 2120   Audit-C Score 0 Filed at: 05/11/2022 2120   MAGDALENO: How many times in the past year have you    Used an illegal drug or used a prescription medication for non-medical reasons?  Never Filed at: 05/11/2022 2120                    MDM  Number of Diagnoses or Management Options  Bipolar 1 disorder ([facility] )  Dementia ([facility] )  Diagnosis management comments: Signed out to dr Graciela Trejo and/or Complexity of Data Reviewed  Clinical lab tests: ordered  Tests in the medicine section of CPT®: ordered        Disposition  Final diagnoses:   Dementia ([facility] [address] )   Bipolar 1 disorder ([facility] )     Time reflects when diagnosis was documented in both MDM as applicable and the Disposition within this note     Time User Action Codes Description Comment    5/12/2022 10:45 AM Debbierosanaayla Mejia Add [F03 90] Dementia ([address] )     5/12/2022 10:45 AM [place] Mejia Add [F31 9] Bipolar 1 disorder [facility])       ED Disposition     ED Disposition   Discharge    Condition   Stable    Date/Time   Thu May 12, 2022 12:51 PM    Comment   Latisha Fontanez discharge to home/self care  Follow-up Information     Follow up With Specialties Details Why Contact Info    Your psychiatrist at [place] [address] an appointment as soon as possible for a visit       [address] [place] [address]  Follow up  [address] [place] [address] [phone]          Discharge Medication List as of 5/12/2022 12:51 PM      CONTINUE these medications which have NOT CHANGED    Details   acetaminophen (TYLENOL) 650 mg CR tablet Take 1 tablet (650 mg total) by mouth every 8 (eight) hours as needed for mild pain, Starting Sun 2/6/2022, Normal      methocarbamol (ROBAXIN) 500 mg tablet Take 1 tablet (500 mg total) by mouth 2 (two) times a day, Starting Sun 2/6/2022, Normal             No discharge procedures on file      PDMP Review     None          ED Provider  Electronically Signed by           Nay Jaquez DO  05/13/22 9455

## 2022-05-12 NOTE — ED NOTES
[facility] called at this time to discuss pt care  Pt information taken at this time  This RN was informed to contact [facility] to discuss if pt is in there care        Santa Loaiza RN  05/12/22 6564

## 2022-05-12 NOTE — DISCHARGE INSTRUCTIONS
Return to the nearest emergency department if you have thoughts of harming herself, if you do not feel safe at home, or are concerned about anything else

## 2022-05-12 NOTE — ED NOTES
Message left on voicemail for case management _Virgie Jerry_regarding an urgent consult in ER     Tricia Angelo RN  05/12/22 5754

## 2022-05-12 NOTE — CASE MANAGEMENT
Case Management Assessment & Discharge Planning Note    Patient name Rosalie Hernandez  Location ED 12/ED 12 MRN [id number]  : 1950 Date 2022       Current Admission Date: 2022  Current Admission Diagnosis:Psychiatric complaint   There are no problems to display for this patient  LOS (days): 0  Geometric Mean LOS (GMLOS) (days):   Days to GMLOS:     OBJECTIVE:              Current admission status: Emergency       Preferred Pharmacy:   [address] [place] [address]  Phone: [phone] Fax: [phone]    Primary Care Provider: Annalee Kingsley PA-C    Primary Insurance: [facility]  Secondary Insurance:     ASSESSMENT:  Fredrick Parker Proxies    There are no active Health Care Proxies on file  Patient Information  Admitted from[de-identified] Home  Mental Status: Alert, Confused  During Assessment patient was accompanied by: Not accompanied during assessment  Assessment information provided by[de-identified] Other - please comment (ex )  Primary Caregiver: Self  Support Systems: Son  [place] of Residence: [address] [place] [address] do you live in?: [facility]   Select all that apply : Stairs  Type of Current Residence: Apartment  Upon entering residence, is there a bedroom on the main floor (no further steps)?: Yes  Upon entering residence, is there a bathroom on the main floor (no further steps)?: Yes  In the last 12 months, was there a time when you were not able to pay the mortgage or rent on time?: No  In the last 12 months, how many places have you lived?: 1  In the last 12 months, was there a time when you did not have a steady place to sleep or slept in a shelter (including now)?: No  Homeless/housing insecurity resource given?: N/A  Living Arrangements: Lives w/ Son      Means of Transportation  [facility] to [facility] [place][de-identified] Family transport  In the past 12 months, has lack of transportation kept you from medical appointments or from getting medications?: No  In the past 12 months, has lack of transportation kept you from meetings, work, or from getting things needed for daily living?: No  Was application for public transport provided?: N/A        DISCHARGE DETAILS:          Additional Comments: CM was notified that there was an ED consult  CM spoke to provider who stated that pt was brought in by EMS and is now medically cleared for d/c however, there was concerns about pt's d/c home as the ED staff has been unable to get in contact with pt's family members  CM discussed case with CM Kanika Ruiz and it was decided that the best course of action would be to inquire about additional family support  CM performed chart review and found contact information for a sister sister Wilbert Vela [phone]; son Alexandra Tobar [phone] and a brother in law Dilma Esquivel [phone]  CM called each number and left a message requesting a return call  CM called pt's PCP office inquiring about aditional numbers and was notified that they have the same contacts as CM  CM attempted to call the numbers again  CM later recieved a return call and recieved additional contacts for pt's dtr Milena Primus (estranged) [phone] ([address] [place] [address]), Hern[place] pt's ex  [phone], pt's sister Raisa Jung (estranged) [phone] and pt's sister Shanna Grijalva (estranged) [email or web address]  CM called pt's dtr and she confirmed that she is estranged from her mother and brother and she does not even know where they live she stated that she would try to see what she can do and will give CM a return call  CM later spoke to pt's ex  who confirmed that pt's lives with her son Alexandra Tobar who is breing treated for mental illnes  Per Hernando her stated that pt is stable and that the pt has a complicated family history but that pt is safe living with Alexandra Tobar and they have no history of violence   Per Iris Patiño he does offer pt support as pt is still living with their son  Hernando consented to provide transport for the pt to return home at 1300 and will check in on [place] as well  CM discussed referrals to VNA and [facility]  Previous aging consult was made by ED provider  CM sent VNA referral to [facility] as pt was previously open to their service  Hernando inquired about POA paperwork and CM provided POA information  CM notified Dr Marlene Major and CM Manager of updated progress and d/c plan  CM will follow
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
## 2022-05-12 NOTE — CASE MANAGEMENT
Case Management Discharge Planning Note    Patient name Latisha Fontanez  Location ED 12/ED 12 MRN [id number]  : 1950 Date 2022       Current Admission Date: 2022  Current Admission Diagnosis:Psychiatric complaint   There are no problems to display for this patient  LOS (days): 0  Geometric Mean LOS (GMLOS) (days):   Days to GMLOS:     OBJECTIVE:            Current admission status: Emergency   Preferred Pharmacy:   [address] [place] [address] Brandon Gilliam [place] [address]  Phone: [phone] Fax: [phone]    Primary Care Provider: Antwan David PA-C    Primary Insurance: [facility]  Secondary Insurance:     DISCHARGE DETAILS:    Additional Comments: Pt accepted by [facility]  AVS faxed via [employer]  CM spoke to Getachew Colbert [phone] from [address] and updated her on pt's d/c and provided Hernando's contact information

## 2022-05-14 LAB — BACTERIA UR CULT: NORMAL

## 2022-06-06 ENCOUNTER — HOSPITAL ENCOUNTER (EMERGENCY)
Facility: HOSPITAL | Age: 72
Discharge: HOME | End: 2022-06-06
Attending: EMERGENCY MEDICINE | Admitting: EMERGENCY MEDICINE
Payer: MEDICARE

## 2022-06-06 VITALS
TEMPERATURE: 98.2 F | OXYGEN SATURATION: 95 % | SYSTOLIC BLOOD PRESSURE: 105 MMHG | RESPIRATION RATE: 16 BRPM | DIASTOLIC BLOOD PRESSURE: 55 MMHG | HEART RATE: 78 BPM

## 2022-06-06 DIAGNOSIS — F31.9 BIPOLAR DISORDER (HCC): ICD-10-CM

## 2022-06-06 DIAGNOSIS — F41.9 ANXIETY: Primary | ICD-10-CM

## 2022-06-06 PROCEDURE — 93005 ELECTROCARDIOGRAM TRACING: CPT

## 2022-06-06 PROCEDURE — 99284 EMERGENCY DEPT VISIT MOD MDM: CPT | Performed by: EMERGENCY MEDICINE

## 2022-06-06 PROCEDURE — 99283 EMERGENCY DEPT VISIT LOW MDM: CPT

## 2022-06-06 RX ORDER — HYDROXYZINE HYDROCHLORIDE 25 MG/1
25 TABLET, FILM COATED ORAL ONCE
Status: COMPLETED | OUTPATIENT
Start: 2022-06-06 | End: 2022-06-06

## 2022-06-06 RX ADMIN — HYDROXYZINE HYDROCHLORIDE 25 MG: 25 TABLET, FILM COATED ORAL at 19:26

## 2022-06-06 NOTE — ED PROVIDER NOTES
History  Chief Complaint   Patient presents with    Panic Attack     Pt reports being anxious and caregiver is unable to help manage anxiety attacks     Patient is a 27-year-old female with a history of bipolar disorder and anxiety who presents with an anxiety attack  She describes a long history of anxiety and agoraphobia  She does follow with a psychiatrist but has missed her last few appointments secondary to the agoraphobia  She was previously on medications, including Seroquel but has not been taking them recently  She has had frequent panic attacks which she describes as palpitations and anxiety  She notes a panic attack prior to arrival   Therefore her son brought her to the emergency department for evaluation  She does note that she has an appointment with her psychiatrist scheduled for this Thursday, 06/09/2022  However she hopesd to get treatment for her anxiety today and something to help her sleep  She denies chest pain, shortness of breath, nausea, vomiting or other complaints  She is not taking any medications currently  She currently lives with her son, who suffers from schizophrenia  She states that he takes good care of her  Based on previous records, it appears the department of aging has been involved in her case previously  History provided by:  Patient  Anxiety  Presenting symptoms: no depression, no disorganized speech, no disorganized thought process, no hallucinations, no homicidal ideas and no suicidal thoughts    Patient accompanied by:  Family member  Chronicity:  Chronic  Treatment compliance:  Untreated  Ineffective treatments:  None tried  Associated symptoms: anxiety    Associated symptoms: no abdominal pain, no chest pain and no headaches        Prior to Admission Medications   Prescriptions Last Dose Informant Patient Reported?  Taking?   acetaminophen (TYLENOL) 650 mg CR tablet   No No   Sig: Take 1 tablet (650 mg total) by mouth every 8 (eight) hours as needed for mild pain   Patient not taking: Reported on 5/11/2022   methocarbamol (ROBAXIN) 500 mg tablet   No No   Sig: Take 1 tablet (500 mg total) by mouth 2 (two) times a day   Patient not taking: Reported on 5/11/2022      Facility-Administered Medications: None       Past Medical History:   Diagnosis Date    Bipolar affect, depressed ([facility] [address] )     Disease of thyroid gland        Past Surgical History:   Procedure Laterality Date    BACK SURGERY      THYROIDECTOMY         History reviewed  No pertinent family history  I have reviewed and agree with the history as documented  E-Cigarette/Vaping    E-Cigarette Use Never User      E-Cigarette/Vaping Substances     Social History     Tobacco Use    Smoking status: Current Every Day Smoker     Packs/day: 0 50     Types: Cigarettes    Smokeless tobacco: Never Used   Vaping Use    Vaping Use: Never used   Substance Use Topics    Alcohol use: Not Currently    Drug use: Not Currently       Review of Systems   Constitutional: Negative for chills, diaphoresis and fever  HENT: Negative for nosebleeds, sore throat and trouble swallowing  Eyes: Negative for photophobia, pain and visual disturbance  Respiratory: Negative for cough, chest tightness and shortness of breath  Cardiovascular: Negative for chest pain, palpitations and leg swelling  Gastrointestinal: Negative for abdominal pain, constipation, diarrhea, nausea and vomiting  Endocrine: Negative for polydipsia and polyuria  Genitourinary: Negative for difficulty urinating, dysuria, hematuria, pelvic pain, vaginal bleeding and vaginal discharge  Musculoskeletal: Negative for back pain, neck pain and neck stiffness  Skin: Negative for pallor and rash  Neurological: Negative for dizziness, seizures, light-headedness and headaches  Psychiatric/Behavioral: Negative for hallucinations, homicidal ideas and suicidal ideas  The patient is nervous/anxious      All other systems reviewed and are negative  Physical Exam  Physical Exam  Vitals and nursing note reviewed  Constitutional:       General: She is not in acute distress  Appearance: She is well-developed  HENT:      Head: Normocephalic and atraumatic  Eyes:      Pupils: Pupils are equal, round, and reactive to light  Cardiovascular:      Rate and Rhythm: Normal rate and regular rhythm  Pulses: Normal pulses  Heart sounds: Normal heart sounds  Pulmonary:      Effort: Pulmonary effort is normal  No respiratory distress  Breath sounds: Normal breath sounds  Abdominal:      General: There is no distension  Palpations: Abdomen is soft  Abdomen is not rigid  Tenderness: There is no abdominal tenderness  There is no guarding or rebound  Musculoskeletal:         General: No tenderness  Normal range of motion  Cervical back: Normal range of motion and neck supple  Lymphadenopathy:      Cervical: No cervical adenopathy  Skin:     General: Skin is warm and dry  Capillary Refill: Capillary refill takes less than 2 seconds  Neurological:      Mental Status: She is alert and oriented to person, place, and time  Cranial Nerves: No cranial nerve deficit  Sensory: No sensory deficit           Vital Signs  ED Triage Vitals [06/06/22 1816]   Temperature Pulse Respirations Blood Pressure SpO2   98 2 °F (36 8 °C) 90 18 97/56 98 %      Temp Source Heart Rate Source Patient Position - Orthostatic VS BP Location FiO2 (%)   Oral Monitor Lying Right arm --      Pain Score       --           Vitals:    06/06/22 1816 06/06/22 2136   BP: 97/56 105/55   Pulse: 90 78   Patient Position - Orthostatic VS: Lying Lying         Visual Acuity      ED Medications  Medications   hydrOXYzine HCL (ATARAX) tablet 25 mg (25 mg Oral Given 6/6/22 1926)       Diagnostic Studies  Results Reviewed     None                 No orders to display              Procedures  ECG 12 Lead Documentation Only    Date/Time: 6/6/2022 8:40 PM  Performed by: Luis Tellez DO  Authorized by: Luis Tellez DO     ECG reviewed by me, the ED Provider: yes    Patient location:  ED  Previous ECG:     Previous ECG:  Compared to current    Similarity:  No change    Comparison to cardiac monitor: Yes    Comments:       Normal sinus rhythm at a rate of 84 beats per minute  Normal intervals  Left axis deviation  Normal QRS  No ST T wave abnormalities  Similar to previous from 04/22/2022  ED Course  ED Course as of 06/06/22 2149 Mon Jun 06, 2022 2003 Ex-, Gwyn Ch, is at bedside  He states that patient is receiving services from [facility]  [employer] is coming to the home  She has an appointment with someone from the [facility] to come to the on Wednesday  She also has an appointment with her psychiatrist on Thursday 2036 Patient's son who was previously at bedside is being seen for a psychiatric evaluation  This young and her son live together  Will discuss disposition of my patient with both patient and Hernando    2055 I spoke with Hernando once again  He does not believe that patient would be appropriate to discharge this evening if her son needs to stay for psychiatric treatment  MDM  Number of Diagnoses or Management Options  Anxiety: new and does not require workup  Bipolar disorder [facility]): established and worsening  Diagnosis management comments: Patient with a history of bipolar disorder and anxiety/panic attacks presents with anxiety  She states that she has missed her previous psychiatry appointments due to agoraphobia  Therefore she has not been refilling and taking her medications  She describes palpitations and anxiety which have improved in the ED  EKG is unremarkable  Patient is feeling much better  However, her ex- states that she has some cognitive decline and is also seeing Neurology for possible dementia    This has been a gradual decline and there is no acute change in her mental status  However her ex- believes that she would be unsafe to be at home by herself  He is unable to stay with her this evening  If her son is deemed safe for discharge, patient's ex- believes that she would be appropriate to discharge with son  I discussed discharge planning with patient  She also would be comfortable with discharge with her son  However she understands that he is currently being evaluated and she may need to stay in the ED overnight if he is voluntarily admitted for psychiatric treatment  Patient is stable at this time  Portions of the above record have been created with voice recognition software   Occasional wrong word or "sound alike" substitutions may have occurred due to the inherent limitations of voice recognition software   Read the chart carefully and recognize, using context, where substitutions may have occurred  Amount and/or Complexity of Data Reviewed  Clinical lab tests: ordered and reviewed  Obtain history from someone other than the patient: yes  Review and summarize past medical records: yes    Risk of Complications, Morbidity, and/or Mortality  Presenting problems: moderate  Diagnostic procedures: low  Management options: moderate    Patient Progress  Patient progress: stable      Disposition  Final diagnoses:   Anxiety   Bipolar disorder ([facility] [address] )     Time reflects when diagnosis was documented in both MDM as applicable and the Disposition within this note     Time User Action Codes Description Comment    6/6/2022  9:40 PM Wilbert Valencia Add [F41 9] Anxiety     6/6/2022  9:40 PM Wilbert Valencia Add [F31 9] Bipolar disorder [facility])       ED Disposition     None      Follow-up Information    None         Patient's Medications   Discharge Prescriptions    No medications on file       No discharge procedures on file      PDMP Review     None          ED Provider  Electronically Signed by           Luis Tellez DO  06/06/22 2149
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
## 2022-06-07 LAB
ATRIAL RATE: 84 BPM
P AXIS: 41 DEGREES
PR INTERVAL: 146 MS
QRS AXIS: -54 DEGREES
QRSD INTERVAL: 62 MS
QT INTERVAL: 356 MS
QTC INTERVAL: 420 MS
T WAVE AXIS: 51 DEGREES
VENTRICULAR RATE: 84 BPM

## 2022-06-07 PROCEDURE — 93010 ELECTROCARDIOGRAM REPORT: CPT | Performed by: INTERNAL MEDICINE

## 2022-06-07 NOTE — ED CARE HANDOFF
Emergency Department Sign Out Note        Sign out and transfer of care from Dr Belkis King  See Separate Emergency Department note  The patient, Mirella Alford, was evaluated by the previous provider for anxiety  Workup Completed:  Original plan from Dr Belkis King was to have patient stay in emergency department for case management consultation, after patient's son signed himself into a psychiatric hospital, she is now saying that she wants to go home  Patient's ex- who is at bedside, says that how she is now is how she has been for years does not feel that she is a danger to herself or others, patient says she does not feel she is a danger to herself or others, he says he will help her get home, but he cannot stay with her as he is remarried and he needs to go home, she says that she has been home by herself intermittently for years when her son is in in out of the house  I discussed my concern about staying for case management consultation, offered crisis consultation now, patient says that she has to get home to feed her cat, ex- Hernando is comfortable with this plan, patient is comfortable with this plan, she is alert oriented x3 understands risks benefits alternatives  , will discharge                                   Procedures  MDM        Disposition  Final diagnoses:   Anxiety   Bipolar disorder ([facility] [address] )     Time reflects when diagnosis was documented in both MDM as applicable and the Disposition within this note     Time User Action Codes Description Comment    6/6/2022  9:40 PM Radha Moreno Add [F41 9] Anxiety     6/6/2022  9:40 PM Radha Moreno Add [F31 9] Bipolar disorder [facility])       ED Disposition     ED Disposition   Discharge    Condition   --    Date/Time   Mon Jun 6, 2022 10:45 PM    Comment   --         Follow-up Information    None       Patient's Medications   Discharge Prescriptions    No medications on file     No discharge procedures on file         ED Provider  Electronically Signed by     Luis Oviedo DO  06/06/22 3645

## 2022-12-08 ENCOUNTER — NURSING HOME VISIT (OUTPATIENT)
Dept: GERIATRICS | Facility: OTHER | Age: 72
End: 2022-12-08

## 2022-12-08 DIAGNOSIS — F31.32 BIPOLAR AFFECTIVE DISORDER, CURRENTLY DEPRESSED, MODERATE (HCC): Primary | ICD-10-CM

## 2022-12-08 DIAGNOSIS — F03.90 MAJOR NEUROCOGNITIVE DISORDER (HCC): ICD-10-CM

## 2022-12-09 PROBLEM — R41.0 CONFUSION: Status: ACTIVE | Noted: 2022-04-10

## 2022-12-09 RX ORDER — QUETIAPINE FUMARATE 25 MG/1
25 TABLET, FILM COATED ORAL
COMMUNITY
Start: 2022-04-12 | End: 2023-04-12

## 2022-12-09 RX ORDER — LEVOTHYROXINE SODIUM 0.1 MG/1
100 TABLET ORAL DAILY
COMMUNITY
Start: 2022-04-10
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
## 2023-01-26 ENCOUNTER — NURSING HOME VISIT (OUTPATIENT)
Dept: GERIATRICS | Facility: OTHER | Age: 73
End: 2023-01-26

## 2023-01-26 DIAGNOSIS — F31.9 BIPOLAR DEPRESSION (HCC): Primary | ICD-10-CM

## 2023-01-26 DIAGNOSIS — F40.01 PANIC DISORDER WITH AGORAPHOBIA AND SEVERE PANIC ATTACKS: ICD-10-CM

## 2023-01-26 DIAGNOSIS — F03.90 MAJOR NEUROCOGNITIVE DISORDER (HCC): ICD-10-CM

## 2023-03-01 NOTE — PROGRESS NOTES
MEDICATION MANAGEMENT NOTE        [place] [address] [facility]  POS: 28: NF- Long Term, [place]      Name and Date of Birth:  Christene Cooks 67 y o  1950 MRN: [id number]    Date of Visit: January 26, 2023    Allergies   Allergen Reactions   • Lamotrigine Other (See Comments)     Burning Mouth   • Bupropion Nausea Only     SUBJECTIVE:    Ирина is seen today for a follow up for Bipolar Disorder, anxiety and dementia  She continues to do relatively well since the last visit  She does appear to be adjusting to facility, and participating in activities  She does admit to ongoing intermittent depression but with slight improvements  No suicidal thoughts or passive death wish  Forgetfulness is noted  She denies any side effects from current psychiatric medications  PLAN:    All medications and routine orders were reviewed and updated as needed  Continue current medications:  Medication management every 2 months  Plan discussed with: Patient    Diagnoses and all orders for this visit:    Bipolar depression ([facility] 75 )    Major neurocognitive disorder ([facility] 75 )    Panic disorder with agoraphobia and severe panic attacks        Current Medications  Medications reviewed and updated in facility chart  Psychotherapy Provided:     Individual psychotherapy provided: Supportive counseling provided  Reassurance and supportive therapy provided  HPI ROS Appetite Changes and Sleep:     She reports fluctuating sleep pattern, fluctuating appetite, fluctuating energy levels   Denies homicidal ideation, denies suicidal ideation    Review Of Systems:   all other systems are negative         Mental Status Evaluation:    Appearance:  age appropriate   Behavior:  pleasant, cooperative   Speech:  normal rate and volume   Mood:  depressed   Affect:  flat   Thought Process:  goal directed   Associations: concrete associations   Thought Content:  no overt delusions   Perceptual Disturbances: none   Risk Potential: Suicidal ideation - None  Homicidal ideation - None  Potential for aggression - No   Sensorium:  oriented to person and place   Memory:  recent memory mildly impaired   Consciousness:  alert and awake   Attention: decreased concentration and decreased attention span   Insight:  limited   Judgment: limited   Gait/Station: in bed   Motor Activity: no abnormal movements     Past Psychiatric History Update:     Inpatient Psychiatric Admission Since Last Encounter:   no  Suicide Attempt Or Self Mutilation Since Last Encounter:   no  Incidence of Violent Behavior Since Last Encounter:   no    Traumatic History Update:     New Onset of Abuse Since Last Encounter:   no  Traumatic Events Since Last Encounter:   no    Social History:    Changes since last encounter:  no    History Review: The following portions of the patient's history were reviewed and updated as appropriate: allergies, current medications, past family history, past medical history, past social history, past surgical history and problem list     OBJECTIVE:     Vital signs in last 24 hours: Vital signs and nursing notes reviewed in facility chart  Laboratory Results: Lab results reviewed in facility chart  Medications Risks/Benefits:      Risks, Benefits And Possible Side Effects Of Medications:    Discussed risks and benefits of treatment with patient including risk of parkinsonian symptoms, metabolic syndrome, tardive dyskinesia and neuroleptic malignant syndrome related to treatment with antipsychotic medications     Controlled Medication Discussion:     Not applicable - controlled prescriptions are not prescribed by this practice    Evaluation of Psychotropic Drugs for possible gradual dose reductions    Psychotropic medications have been reviewed  Patient continues with symptoms of bipolar disorder as noted above    Any/or further dose reductions at this time would be clinically contraindicated, as it would be likely to cause worsening of symptoms          MERRY Layton

## 2023-04-27 ENCOUNTER — NURSING HOME VISIT (OUTPATIENT)
Dept: GERIATRICS | Facility: OTHER | Age: 73
End: 2023-04-27

## 2023-04-27 DIAGNOSIS — F40.01 PANIC DISORDER WITH AGORAPHOBIA AND SEVERE PANIC ATTACKS: ICD-10-CM

## 2023-04-27 DIAGNOSIS — F31.9 BIPOLAR DEPRESSION (HCC): Primary | ICD-10-CM

## 2023-04-27 DIAGNOSIS — F60.9 PERSONALITY DISORDER (HCC): ICD-10-CM

## 2023-04-29 NOTE — PROGRESS NOTES
MEDICATION MANAGEMENT NOTE        [place] [address] [facility]  POS: 28: NF- [address] [facility]      Name and Date of Birth:  Anjelica Garcia 67 y o  1950 MRN: [id number]    Date of Visit: April 27, 2023    Allergies   Allergen Reactions   • Lamotrigine Other (See Comments)     Burning Mouth   • Bupropion Nausea Only     SUBJECTIVE:    Ирина is seen today for a follow up for depression, Bipolar Disorder, anxiety and dementia  She continues to experience ongoing symptoms since the last visit  Parul Hunt is seen in follow-up for her bipolar disorder and anxiety  She presents pleasant and social and minimizes symptoms of depression and anxiety  No symptoms of reji and her bipolar disorder appears to be fairly stable  She has been experiencing some increase in confusion and forgetfulness and wandering and needing more reminders from staff  She has not been tried on any medications for her dementia and will recommend a trial of Aricept  Continue all other medications the same  She denies any side effects from current psychiatric medications  PLAN:    All medications and routine orders were reviewed and updated as needed  Recommend starting Aricept 5 mg  Medication management every 1 month  Plan discussed with: Patient    Diagnoses and all orders for this visit:    Bipolar depression ([facility] 75 )    Personality disorder ([facility] 75 )    Panic disorder with agoraphobia and severe panic attacks        Current Medications  Medications reviewed and updated in facility chart  Psychotherapy Provided:     Individual psychotherapy provided: Supportive counseling provided  Medications, treatment progress and treatment plan reviewed with Ирина  Reassurance and supportive therapy provided  HPI ROS Appetite Changes and Sleep:     She reports fluctuating sleep pattern, fluctuating appetite, fluctuating energy levels   Denies homicidal ideation, denies suicidal ideation    Review Of Systems:   all other systems are negative         Mental Status Evaluation:    Appearance:  age appropriate   Behavior:  pleasant   Speech:  normal rate, normal volume   Mood:  dysphoric   Affect:  blunted   Thought Process:  disorganized   Associations: concrete associations   Thought Content:  no overt delusions   Perceptual Disturbances: none   Risk Potential: Suicidal ideation - None  Homicidal ideation - None  Potential for aggression - No   Sensorium:  oriented to person   Memory:  recent memory impaired   Consciousness:  alert and awake   Attention: decreased concentration and decreased attention span   Insight:  limited   Judgment: limited   Gait/Station: uses walker   Motor Activity: no abnormal movements     Past Psychiatric History Update:     Inpatient Psychiatric Admission Since Last Encounter:   no  Suicide Attempt Or Self Mutilation Since Last Encounter:   no  Incidence of Violent Behavior Since Last Encounter:   no    Traumatic History Update:     New Onset of Abuse Since Last Encounter:   no  Traumatic Events Since Last Encounter:   no    Social History:    Changes since last encounter:  no    History Review: The following portions of the patient's history were reviewed and updated as appropriate: allergies, current medications, past family history, past medical history, past social history and past surgical history     OBJECTIVE:     Vital signs in last 24 hours: Vital signs and nursing notes reviewed in facility chart  Laboratory Results: Lab results reviewed in facility chart        Medications Risks/Benefits:      Risks, Benefits And Possible Side Effects Of Medications:    Discussed risks and benefits of treatment with patient including risk of parkinsonian symptoms, metabolic syndrome, tardive dyskinesia and neuroleptic malignant syndrome related to treatment with antipsychotic medications     Controlled Medication Discussion:     Not applicable - controlled prescriptions are not prescribed by this practice    Evaluation of Psychotropic Drugs for possible gradual dose reductions    Psychotropic medications have been reviewed  Patient continues with symptoms of bipolar disorder with anxiety and some mild paranoia as noted above  Any/or further dose reductions at this time would be clinically contraindicated, as it would be likely to cause worsening of symptoms          MERRY Ryan

## 2023-05-30 ENCOUNTER — NURSING HOME VISIT (OUTPATIENT)
Dept: GERIATRICS | Facility: OTHER | Age: 73
End: 2023-05-30
Payer: MEDICARE

## 2023-05-30 DIAGNOSIS — F40.01 PANIC DISORDER WITH AGORAPHOBIA AND SEVERE PANIC ATTACKS: ICD-10-CM

## 2023-05-30 DIAGNOSIS — F31.9 BIPOLAR DEPRESSION (HCC): Primary | ICD-10-CM

## 2023-05-30 DIAGNOSIS — F03.90 MAJOR NEUROCOGNITIVE DISORDER (HCC): ICD-10-CM

## 2023-05-30 PROCEDURE — 99308 SBSQ NF CARE LOW MDM 20: CPT | Performed by: NURSE PRACTITIONER

## 2023-06-08 ENCOUNTER — NURSING HOME VISIT (OUTPATIENT)
Dept: GERIATRICS | Facility: OTHER | Age: 73
End: 2023-06-08
Payer: MEDICARE

## 2023-06-08 DIAGNOSIS — F31.9 BIPOLAR DEPRESSION (HCC): Primary | ICD-10-CM

## 2023-06-08 DIAGNOSIS — F40.01 PANIC DISORDER WITH AGORAPHOBIA AND SEVERE PANIC ATTACKS: ICD-10-CM

## 2023-06-08 DIAGNOSIS — F03.90 MAJOR NEUROCOGNITIVE DISORDER (HCC): ICD-10-CM

## 2023-06-08 PROCEDURE — 99308 SBSQ NF CARE LOW MDM 20: CPT | Performed by: NURSE PRACTITIONER

## 2023-07-13 ENCOUNTER — NURSING HOME VISIT (OUTPATIENT)
Dept: GERIATRICS | Facility: OTHER | Age: 73
End: 2023-07-13
Payer: MEDICARE

## 2023-07-13 DIAGNOSIS — F03.90 MAJOR NEUROCOGNITIVE DISORDER (HCC): ICD-10-CM

## 2023-07-13 DIAGNOSIS — F40.01 PANIC DISORDER WITH AGORAPHOBIA AND SEVERE PANIC ATTACKS: ICD-10-CM

## 2023-07-13 DIAGNOSIS — F31.9 BIPOLAR DEPRESSION (HCC): Primary | ICD-10-CM

## 2023-07-13 PROCEDURE — 99308 SBSQ NF CARE LOW MDM 20: CPT | Performed by: NURSE PRACTITIONER

## 2023-07-26 NOTE — PROGRESS NOTES
MEDICATION MANAGEMENT NOTE        [address] [facility]  POS: 28: NF- Long Term, [address] [place]      Name and Date of Birth:  Luz Geller 75 y.o. 1950 MRN: [id number]    Date of Visit: June 8, 2023    Allergies   Allergen Reactions   • Lamotrigine Other (See Comments)     Burning Mouth   • Bupropion Nausea Only     SUBJECTIVE:    Ирина is seen today for a follow up for Bipolar Disorder, Adjustment Disorder, anxiety and dementia. She continues to experience on and off symptoms since the last visit. At last visit, recommending increasing her Seroquel to 75 mg then 100 mg for sleep and mood issues. However, she was somewhat slowed down/lethargic initially with Seroquel 75 mg and so dose was not increased any further. She seems to be able to tolerate the dose of 75 mg at this time with no noted lethargy. Sleep improved. She reports intermittent low mood continues. Will continue medications the same for now and follow in one month. She denies any side effects from current psychiatric medications. PLAN:    All medications and routine orders were reviewed and updated as needed. Continue current medications:  Medication management every 1 month      Diagnoses and all orders for this visit:    Bipolar depression ([address])    Panic disorder with agoraphobia and severe panic attacks    Major neurocognitive disorder ([address])        Current Medications  Medications reviewed and updated in facility chart. HPI ROS Appetite Changes and Sleep:     She reports fluctuating sleep pattern, fluctuating appetite, fluctuating energy levels.  Denies homicidal ideation, denies suicidal ideation    Review Of Systems:   no complaints, all other systems are negative         Mental Status Evaluation:    Appearance:  age appropriate   Behavior:  pleasant   Speech:  normal rate, normal volume   Mood:  depressed   Affect:  flat   Thought Process:  disorganized   Associations: concrete associations   Thought Content:  no overt delusions   Perceptual Disturbances: none   Risk Potential: Suicidal ideation - None  Homicidal ideation - None  Potential for aggression - No   Sensorium:  oriented to person and place   Memory:  recent memory impaired   Consciousness:  alert and awake   Attention: decreased concentration and decreased attention span   Insight:  poor   Judgment: poor   Gait/Station: normal gait/station, normal balance   Motor Activity: no abnormal movements       History Review: The following portions of the patient's history were reviewed and updated as appropriate: psychiatric history, trauma history allergies, current medications, past family history, past medical history, past social history, past surgical history and problem list     OBJECTIVE:     Vital signs in last 24 hours: Vital signs and nursing notes reviewed in facility chart. Laboratory Results: Lab results reviewed in facility chart. Medications Risks/Benefits:      Risks, Benefits And Possible Side Effects Of Medications:    Discussed risks and benefits of treatment with patient including risk of suicidality, serotonin syndrome, increased QTc interval and SIADH related to treatment with antidepressants; Risk of induction of manic symptoms in certain patient populations and risk of parkinsonian symptoms, metabolic syndrome, tardive dyskinesia and neuroleptic malignant syndrome related to treatment with antipsychotic medications     Controlled Medication Discussion:     Not applicable - controlled prescriptions are not prescribed by this practice    Evaluation of Psychotropic Drugs for possible gradual dose reductions    Psychotropic medications have been reviewed. Patient continues with symptoms of depression worsening at times as noted above. Any/or further dose reductions at this time would be clinically contraindicated, as it would be likely to cause worsening of symptoms.         MERRY Rothman

## 2023-07-26 NOTE — PROGRESS NOTES
MEDICATION MANAGEMENT NOTE        [facility] [facility]  POS: 28: NF- Long Term, [place]      Name and Date of Birth:  Roberta Stauffer 75 y.o. 1950 MRN: [id number]    Date of Visit: May 30, 2023    Allergies   Allergen Reactions   • Lamotrigine Other (See Comments)     Burning Mouth   • Bupropion Nausea Only     SUBJECTIVE:    Ирина is seen today for a follow up for Bipolar Disorder, Generalized Anxiety Disorder, Panic Disorder and dementia. She continues to experience on and off symptoms since the last visit. Some increase in wandering and confusion. She is struggling with sleep and reports to me some increase in depressive symptoms. She states she is feeling safe here and getting use to being here. Denies any suicidal thoughts or passive death wish. Will recommend increase of Seroquel at . She denies any side effects from current psychiatric medications. PLAN:    All medications and routine orders were reviewed and updated as needed. Recommend increase of Seroquel    Medication management every 1 week      Diagnoses and all orders for this visit:    Bipolar depression ([address])    Major neurocognitive disorder ([address])    Panic disorder with agoraphobia and severe panic attacks        Current Medications  Medications reviewed and updated in facility chart. HPI ROS Appetite Changes and Sleep:     She reports fluctuating sleep pattern, fluctuating appetite, fluctuating energy levels.  Denies homicidal ideation, denies suicidal ideation    Review Of Systems:   no complaints, all other systems are negative         Mental Status Evaluation:    Appearance:  age appropriate   Behavior:  pleasant, cooperative   Speech:  normal rate, normal volume   Mood:  depressed   Affect:  flat   Thought Process:  circumstantial   Associations: concrete associations   Thought Content:  no overt delusions   Perceptual Disturbances: none   Risk Potential: Suicidal ideation - None  Homicidal ideation - None  Potential for aggression - No   Sensorium:  oriented to person and place   Memory:  recent memory impaired   Consciousness:  alert and awake   Attention: decreased concentration and decreased attention span   Insight:  poor   Judgment: poor   Gait/Station: normal gait/station, normal balance   Motor Activity: no abnormal movements       History Review: The following portions of the patient's history were reviewed and updated as appropriate: psychiatric history, trauma history allergies, current medications, past family history, past medical history, past social history, past surgical history and problem list     OBJECTIVE:     Vital signs in last 24 hours: Vital signs and nursing notes reviewed in facility chart. Laboratory Results: Lab results reviewed in facility chart. Medications Risks/Benefits:      Risks, Benefits And Possible Side Effects Of Medications:    Discussed risks and benefits of treatment with patient including risk of suicidality, serotonin syndrome, increased QTc interval and SIADH related to treatment with antidepressants; Risk of induction of manic symptoms in certain patient populations and risk of parkinsonian symptoms, metabolic syndrome, tardive dyskinesia and neuroleptic malignant syndrome related to treatment with antipsychotic medications     Controlled Medication Discussion:     Not applicable - controlled prescriptions are not prescribed by this practice    Evaluation of Psychotropic Drugs for possible gradual dose reductions    Psychotropic medications have been reviewed. Patient continues with symptoms of worsening depression and insomnia as noted above. Any/or further dose reductions at this time would be clinically contraindicated, as it would be likely to cause worsening of symptoms.         MERRY Cavazos

## 2023-07-26 NOTE — PROGRESS NOTES
MEDICATION MANAGEMENT NOTE        [address] [facility]  POS: 28: NF- Long Term, [employer]      Name and Date of Birth:  Karie Murdock 75 y.o. 1950 MRN: [id number]    Date of Visit: July 13, 2023    Allergies   Allergen Reactions   • Lamotrigine Other (See Comments)     Burning Mouth   • Bupropion Nausea Only     SUBJECTIVE:    Ирина is seen today for a follow up for Bipolar Disorder, Generalized Anxiety Disorder, Panic Disorder and dementia. She continues to experience on and off symptoms since the last visit. She remains on Seroquel 75 mg with no new complaints of dizziness. Mood improved, no unwanted behaviors. She still complains of intermittent insomnia. Otherwise, brighter overall. She denies any side effects from current psychiatric medications. PLAN:    All medications and routine orders were reviewed and updated as needed. Continue current medications:  Medication management every 2 months      Diagnoses and all orders for this visit:    Bipolar depression ([address])    Major neurocognitive disorder ([address])    Panic disorder with agoraphobia and severe panic attacks        Current Medications  Medications reviewed and updated in facility chart. HPI ROS Appetite Changes and Sleep:     She reports fluctuating sleep pattern, fluctuating appetite, fluctuating energy levels.  Denies homicidal ideation, denies suicidal ideation    Review Of Systems:   no complaints, all other systems are negative         Mental Status Evaluation:    Appearance:  age appropriate   Behavior:  pleasant, cooperative   Speech:  normal rate, normal volume   Mood:  improved   Affect:  brighter   Thought Process:  disorganized   Associations: concrete associations   Thought Content:  no overt delusions   Perceptual Disturbances: none   Risk Potential: Suicidal ideation - None  Homicidal ideation - None  Potential for aggression - No   Sensorium:  oriented to person, place and time/date   Memory:  recent and remote memory grossly intact   Consciousness:  alert and awake   Attention: decreased concentration and decreased attention span   Insight:  limited   Judgment: limited   Gait/Station: normal gait/station, normal balance   Motor Activity: no abnormal movements       History Review: The following portions of the patient's history were reviewed and updated as appropriate: psychiatric history, trauma history allergies, current medications, past family history, past medical history, past social history, past surgical history and problem list     OBJECTIVE:     Vital signs in last 24 hours: Vital signs and nursing notes reviewed in facility chart. Laboratory Results: Lab results reviewed in facility chart. Medications Risks/Benefits:      Risks, Benefits And Possible Side Effects Of Medications:    Discussed risks and benefits of treatment with patient including risk of suicidality, serotonin syndrome, increased QTc interval and SIADH related to treatment with antidepressants; Risk of induction of manic symptoms in certain patient populations and risk of parkinsonian symptoms, metabolic syndrome, tardive dyskinesia and neuroleptic malignant syndrome related to treatment with antipsychotic medications     Controlled Medication Discussion:     Not applicable - controlled prescriptions are not prescribed by this practice    Evaluation of Psychotropic Drugs for possible gradual dose reductions    Psychotropic medications have been reviewed. Patient continues with symptoms of intermittent worsening depression, anxiety, and insomnia as noted above. Any/or further dose reductions at this time would be clinically contraindicated, as it would be likely to cause worsening of symptoms.         MERRY Cavazos

## 2023-10-05 ENCOUNTER — OFFICE VISIT (OUTPATIENT)
Dept: CARDIOLOGY CLINIC | Facility: CLINIC | Age: 73
End: 2023-10-05
Payer: MEDICARE

## 2023-10-05 VITALS
OXYGEN SATURATION: 98 % | HEART RATE: 89 BPM | WEIGHT: 111 LBS | BODY MASS INDEX: 21.68 KG/M2 | SYSTOLIC BLOOD PRESSURE: 110 MMHG | DIASTOLIC BLOOD PRESSURE: 60 MMHG

## 2023-10-05 DIAGNOSIS — R94.31 ABNORMAL ECG: Primary | ICD-10-CM

## 2023-10-05 DIAGNOSIS — F41.9 ANXIETY: ICD-10-CM

## 2023-10-05 DIAGNOSIS — R00.2 PALPITATIONS: ICD-10-CM

## 2023-10-05 PROCEDURE — 99203 OFFICE O/P NEW LOW 30 MIN: CPT | Performed by: INTERNAL MEDICINE

## 2023-10-05 PROCEDURE — 93000 ELECTROCARDIOGRAM COMPLETE: CPT | Performed by: INTERNAL MEDICINE

## 2023-10-05 RX ORDER — DONEPEZIL HYDROCHLORIDE 10 MG/1
TABLET, FILM COATED ORAL
COMMUNITY
Start: 2023-08-16

## 2023-10-05 RX ORDER — NAPROXEN 500 MG/1
TABLET ORAL
COMMUNITY
Start: 2023-08-22

## 2023-10-05 RX ORDER — QUETIAPINE FUMARATE 50 MG/1
TABLET, FILM COATED ORAL
COMMUNITY
Start: 2023-08-16

## 2023-10-05 RX ORDER — LEVOTHYROXINE SODIUM 88 UG/1
TABLET ORAL
COMMUNITY
Start: 2023-08-24

## 2023-10-05 RX ORDER — QUETIAPINE FUMARATE 25 MG/1
TABLET, FILM COATED ORAL
COMMUNITY
Start: 2023-08-16

## 2023-10-05 NOTE — PROGRESS NOTES
[facility] [address]  [phone]                                            Cardiology Office Consult  Sarah Dodson, 75 y.o. female  YOB: 1950  MRN: [id number] Encounter: [id number]      PCP - Stephany Ortiz PA-C  Referring Provider - Self, Referral    Chief Complaint   Patient presents with   • Consult       Assessment  Abnormal ECG  Palpitations  Anxiety / Bipolar disorder    Plan  Palpitations, abnormal ECG  Reports almost daily palpitations for quite long time with some episodes continuing for a few minutes  She does have anxiety/panic disorder and bipolar disorder diagnosis, and this may simply be sinus tachycardia related to the same  But atrial tachycardia/A-fib remains a possibility  ECG today -   Check 48-hour Holter monitor  Check echocardiogram      Results for orders placed or performed in visit on 10/05/23   POCT ECG    Impression    Normal sinus rhythm   Right axis deviation  Possible lead reversal         Orders Placed This Encounter   Procedures   • Holter monitor   • POCT ECG   • Echo complete w/ contrast if indicated     Return in about 3 months (around 1/5/2024), or if symptoms worsen or fail to improve. History of Present Illness   75 y.o. female , long-term resident at [facility], comes in as a new patient for establishment of care and evaluation regarding recent ECG. She was not a complaint of chest pain, shortness of breath, palpitations or dizziness. She has had anxiety/panic attacks for many years and has been on medical therapy and follows with a psychiatrist.     She reports that she has had on and off palpitations for many years, where she has heart racing sensation for several minutes. This has not changed significantly in recent years, she has not had any other evaluations for this. No associated dizziness or lightheadedness, near-syncope or syncope.   No known prior heart problems, coronary disease or heart failure. Historical Information   Past Medical History:   Diagnosis Date   • Bipolar affect, depressed ([address])    • Disease of thyroid gland      Past Surgical History:   Procedure Laterality Date   • BACK SURGERY     • THYROIDECTOMY       History reviewed. No pertinent family history. Current Outpatient Medications on File Prior to Visit   Medication Sig Dispense Refill   • acetaminophen (TYLENOL) 650 mg CR tablet Take 1 tablet (650 mg total) by mouth every 8 (eight) hours as needed for mild pain 30 tablet 0   • donepezil (ARICEPT) 10 mg tablet      • levothyroxine 88 mcg tablet      • naproxen (NAPROSYN) 500 mg tablet      • QUEtiapine (SEROquel) 25 mg tablet      • QUEtiapine (SEROquel) 50 mg tablet      • methocarbamol (ROBAXIN) 500 mg tablet Take 1 tablet (500 mg total) by mouth 2 (two) times a day (Patient not taking: Reported on 5/11/2022) 20 tablet 0   • [DISCONTINUED] levothyroxine 100 mcg tablet Take 100 mcg by mouth daily       No current facility-administered medications on file prior to visit. Allergies   Allergen Reactions   • Lamotrigine Other (See Comments)     Burning Mouth   • Bupropion Nausea Only     Social History     Socioeconomic History   • Marital status:      Spouse name: None   • Number of children: None   • Years of education: None   • Highest education level: None   Occupational History   • None   Tobacco Use   • Smoking status: Former     Packs/day: 0.50     Types: Cigarettes   • Smokeless tobacco: Never   Vaping Use   • Vaping Use: Never used   Substance and Sexual Activity   • Alcohol use: Not Currently   • Drug use: Not Currently   • Sexual activity: None   Other Topics Concern   • None   Social History Narrative   • None     Social Determinants of Health     Financial Resource Strain: Not on file   Food Insecurity: Not on file   Transportation Needs: No Transportation Needs (5/12/2022)    PRAPARE - Transportation    • Lack of Transportation (Medical):  No    • Lack of Transportation (Non-Medical): No   Physical Activity: Not on file   Stress: Not on file   Social Connections: Not on file   Intimate Partner Violence: Not on file   Housing Stability: Low Risk  (5/12/2022)    Housing Stability Vital Sign    • Unable to Pay for Housing in the Last Year: No    • Number of Places Lived in the Last Year: 1    • Unstable Housing in the Last Year: No        Review of Systems   All other systems reviewed and are negative. Vitals:  Vitals:    10/05/23 1421   BP: 110/60   BP Location: Left arm   Patient Position: Sitting   Cuff Size: Standard   Pulse: 89   SpO2: 98%   Weight: 50.3 kg (111 lb)     BMI - Body mass index is 21.68 kg/m². Wt Readings from Last 7 Encounters:   10/05/23 50.3 kg (111 lb)   04/12/22 49.9 kg (110 lb 0.2 oz)   02/06/22 49.9 kg (110 lb)       Physical Exam  Vitals and nursing note reviewed. Constitutional:       General: She is not in acute distress. Appearance: Normal appearance. She is well-developed. She is not ill-appearing. HENT:      Head: Normocephalic and atraumatic. Nose: No congestion. Eyes:      General: No scleral icterus. Conjunctiva/sclera: Conjunctivae normal.   Neck:      Vascular: No carotid bruit or JVD. Cardiovascular:      Rate and Rhythm: Normal rate and regular rhythm. Pulses: Normal pulses. Heart sounds: Murmur heard. No friction rub. No gallop. Pulmonary:      Effort: Pulmonary effort is normal. No respiratory distress. Breath sounds: Normal breath sounds. No rales. Abdominal:      General: There is no distension. Palpations: Abdomen is soft. Tenderness: There is no abdominal tenderness. Musculoskeletal:         General: No swelling or tenderness. Cervical back: Neck supple. Right lower leg: No edema. Left lower leg: No edema. Skin:     General: Skin is warm. Neurological:      General: No focal deficit present.       Mental Status: She is alert and oriented to person, place, and time. Mental status is at baseline. Psychiatric:         Mood and Affect: Mood normal.         Behavior: Behavior normal.         Thought Content: Thought content normal.         Labs:  CBC:   Lab Results   Component Value Date    WBC 6.64 05/12/2022    RBC 3.53 (L) 05/12/2022    HGB 11.9 05/12/2022    HCT 34.9 05/12/2022    MCV 99 (H) 05/12/2022     05/12/2022    RDW 13.2 05/12/2022       CMP:   Lab Results   Component Value Date    K 3.1 (L) 05/12/2022     05/12/2022    CO2 26 05/12/2022    BUN 22 05/12/2022    CREATININE 0.94 05/12/2022    EGFR 60 05/12/2022    CALCIUM 9.2 05/12/2022    AST 21 05/12/2022    ALT 17 05/12/2022    ALKPHOS 52 05/12/2022       Magnesium:  No results found for: "MG"    Lipid Profile:   No results found for: "CHOL", "HDL", "TRIG", "[facility]"    Thyroid Studies:   Lab Results   Component Value Date    UEA4MUSMBPNR 4.190 05/12/2022       A1c:  No components found for: "HGA1C"    INR:  No results found for: "INR"5    Imaging: No results found. Cardiac testing:   No results found for this or any previous visit. No results found for this or any previous visit. No results found for this or any previous visit. No results found for this or any previous visit. XR chest 1 view portable  Narrative: CHEST     INDICATION:   Weakness. Status post fall    COMPARISON:  Chest CT from 2/6/2022. EXAM PERFORMED/VIEWS:  XR CHEST PORTABLE    FINDINGS:    Cardiomediastinal silhouette appears unremarkable. The lungs are clear. No pneumothorax or pleural effusion. Skinfold projecting over the left costophrenic sulcus    Osseous structures appear within normal limits for patient age. No acute displaced fracture. IVC filter. Cholecystectomy. Impression: No acute cardiopulmonary disease.     Workstation performed: [id number]  CT head without contrast  Narrative: CT BRAIN - WITHOUT CONTRAST    INDICATION:   Mental status change, unknown cause  Altered mental status. COMPARISON:  CT brain dated 8/18/2016. TECHNIQUE:  CT examination of the brain was performed. In addition to axial images, sagittal and coronal 2D reformatted images were created and submitted for interpretation. Radiation dose length product (DLP) for this visit:  927 mGy-cm . This examination, like all CT scans performed in the [facility], was performed utilizing techniques to minimize radiation dose exposure, including the use of iterative   reconstruction and automated exposure control. IMAGE QUALITY:  Diagnostic. FINDINGS:    PARENCHYMA:  No intracranial mass, mass effect or midline shift. No CT signs of acute infarction. No acute parenchymal hemorrhage. There is moderate periventricular white matter low attenuation which is nonspecific and most likely related to chronic   small vessel ischemic changes. VENTRICLES AND EXTRA-AXIAL SPACES:  Normal for the patient's age. VISUALIZED ORBITS AND PARANASAL SINUSES:  Unremarkable. CALVARIUM AND EXTRACRANIAL SOFT TISSUES:  Normal.  Impression: No acute intracranial abnormality. Moderate chronic small vessel ischemic changes.     Workstation performed: [id number]

## 2023-10-16 ENCOUNTER — HOSPITAL ENCOUNTER (OUTPATIENT)
Dept: NON INVASIVE DIAGNOSTICS | Facility: HOSPITAL | Age: 73
Discharge: HOME | End: 2023-10-16
Attending: INTERNAL MEDICINE
Payer: MEDICARE

## 2023-10-16 VITALS
HEART RATE: 89 BPM | HEIGHT: 60 IN | BODY MASS INDEX: 21.79 KG/M2 | WEIGHT: 111 LBS | DIASTOLIC BLOOD PRESSURE: 60 MMHG | SYSTOLIC BLOOD PRESSURE: 110 MMHG

## 2023-10-16 DIAGNOSIS — R94.31 ABNORMAL ECG: ICD-10-CM

## 2023-10-16 DIAGNOSIS — R00.2 PALPITATIONS: ICD-10-CM

## 2023-10-16 LAB
AORTIC ROOT: 3 CM
AORTIC VALVE MEAN VELOCITY: 13.4 M/S
APICAL FOUR CHAMBER EJECTION FRACTION: 73 %
AV AREA BY CONTINUOUS VTI: 1.4 CM2
AV AREA PEAK VELOCITY: 1.6 CM2
AV LVOT MEAN GRADIENT: 2 MMHG
AV LVOT PEAK GRADIENT: 4 MMHG
AV MEAN GRADIENT: 8 MMHG
AV PEAK GRADIENT: 14 MMHG
AV REGURGITATION PRESSURE HALF TIME: 415 MS
AV VALVE AREA: 1.42 CM2
AV VELOCITY RATIO: 0.54
DOP CALC AO PEAK VEL: 1.9 M/S
DOP CALC AO VTI: 39.35 CM
DOP CALC LVOT AREA: 2.83 CM2
DOP CALC LVOT CARDIAC INDEX: 3.14 L/MIN/M2
DOP CALC LVOT CARDIAC OUTPUT: 4.56 L/MIN
DOP CALC LVOT DIAMETER: 1.9 CM
DOP CALC LVOT PEAK VEL VTI: 19.71 CM
DOP CALC LVOT PEAK VEL: 1.03 M/S
DOP CALC LVOT STROKE INDEX: 39.3 ML/M2
DOP CALC LVOT STROKE VOLUME: 55.86
E WAVE DECELERATION TIME: 282 MS
E/A RATIO: 0.74
FRACTIONAL SHORTENING: 41 (ref 28–44)
INTERVENTRICULAR SEPTUM IN DIASTOLE (PARASTERNAL SHORT AXIS VIEW): 0.9 CM
INTERVENTRICULAR SEPTUM: 0.9 CM (ref 0.6–1.1)
LAAS-AP2: 15.8 CM2
LAAS-AP4: 16.7 CM2
LEFT ATRIUM SIZE: 2.4 CM
LEFT ATRIUM VOLUME (MOD BIPLANE): 43 ML
LEFT ATRIUM VOLUME INDEX (MOD BIPLANE): 29.7 ML/M2
LEFT INTERNAL DIMENSION IN SYSTOLE: 2.2 CM (ref 2.1–4)
LEFT VENTRICULAR INTERNAL DIMENSION IN DIASTOLE: 3.7 CM (ref 3.5–6)
LEFT VENTRICULAR POSTERIOR WALL IN END DIASTOLE: 0.9 CM
LEFT VENTRICULAR STROKE VOLUME: 42 ML
LVSV (TEICH): 42 ML
MV E'TISSUE VEL-SEP: 9 CM/S
MV PEAK A VEL: 0.94 M/S
MV PEAK E VEL: 70 CM/S
MV STENOSIS PRESSURE HALF TIME: 82 MS
MV VALVE AREA P 1/2 METHOD: 2.68
RA PRESSURE ESTIMATED: 5 MMHG
RIGHT ATRIAL 2D VOLUME: 17 ML
RIGHT ATRIUM AREA SYSTOLE A4C: 9.4 CM2
RIGHT VENTRICLE ID DIMENSION: 3 CM
RV PSP: 27 MMHG
SL CV AV DECELERATION TIME RETROGRADE: 1432 MS
SL CV AV PEAK GRADIENT RETROGRADE: 67 MMHG
SL CV LEFT ATRIUM LENGTH A2C: 5.3 CM
SL CV LV EF: 60
SL CV PED ECHO LEFT VENTRICLE DIASTOLIC VOLUME (MOD BIPLANE) 2D: 58 ML
SL CV PED ECHO LEFT VENTRICLE SYSTOLIC VOLUME (MOD BIPLANE) 2D: 16 ML
TR MAX PG: 22 MMHG
TR PEAK VELOCITY: 2.3 M/S
TRICUSPID ANNULAR PLANE SYSTOLIC EXCURSION: 2.2 CM
TRICUSPID VALVE PEAK REGURGITATION VELOCITY: 2.33 M/S

## 2023-10-16 PROCEDURE — 93306 TTE W/DOPPLER COMPLETE: CPT

## 2023-10-16 PROCEDURE — 93306 TTE W/DOPPLER COMPLETE: CPT | Performed by: INTERNAL MEDICINE

## 2024-01-02 LAB — HBA1C MFR BLD HPLC: 5.9 %

## 2024-01-18 LAB — HBA1C MFR BLD HPLC: 5.5 %
